# Patient Record
Sex: MALE | Race: WHITE | Employment: UNEMPLOYED | URBAN - METROPOLITAN AREA
[De-identification: names, ages, dates, MRNs, and addresses within clinical notes are randomized per-mention and may not be internally consistent; named-entity substitution may affect disease eponyms.]

---

## 2019-01-04 ENCOUNTER — HOSPITAL ENCOUNTER (OUTPATIENT)
Dept: ORTHOPEDIC SURGERY | Age: 79
Discharge: HOME | End: 2019-01-06
Payer: MEDICARE

## 2019-01-04 DIAGNOSIS — M25.561 ARTHRALGIA OF RIGHT LOWER LEG: ICD-10-CM

## 2019-01-04 DIAGNOSIS — M25.562 ARTHRALGIA OF LEFT LOWER LEG: ICD-10-CM

## 2019-01-04 PROCEDURE — 73564 X-RAY EXAM KNEE 4 OR MORE: CPT

## 2022-04-08 ENCOUNTER — HOSPITAL ENCOUNTER (OUTPATIENT)
Dept: PHYSICAL THERAPY | Age: 82
Setting detail: THERAPIES SERIES
Discharge: HOME | End: 2022-04-08
Payer: MEDICARE

## 2022-04-08 PROCEDURE — 97162 PT EVAL MOD COMPLEX 30 MIN: CPT

## 2022-04-08 ASSESSMENT — PAIN DESCRIPTION - LOCATION: LOCATION: HIP

## 2022-04-08 ASSESSMENT — PAIN SCALES - GENERAL: PAINLEVEL_OUTOF10: 4

## 2022-04-08 ASSESSMENT — PAIN DESCRIPTION - DESCRIPTORS: DESCRIPTORS: BURNING

## 2022-04-08 ASSESSMENT — PAIN DESCRIPTION - ORIENTATION: ORIENTATION: LEFT

## 2022-04-08 ASSESSMENT — PAIN DESCRIPTION - PAIN TYPE: TYPE: CHRONIC PAIN

## 2022-04-08 NOTE — PROGRESS NOTES
[address]  PHYSICAL THERAPY EVALUATION    Date: 2022  Patient Name: Celina Alejandre       MRN: [id number]   Account: [de-identified]   : 1940  (80 y.o.)   Gender: male   Referring Practitioner: Montrell Casanova MD                 Diagnosis: Lumbar radiculopathy; Sciatica  Treatment Diagnosis: left hip pain, decreased LE strength, decreased ROM in lumbar and hip  Additional Pertinent Hx: caridac stent, bronchitis, hx of facture of shoulder, OA, vertigo             Past Medical History:  has no past medical history on file. Past Surgical History:   has no past surgical history on file. Vital Signs  Patient Currently in Pain: Yes   Pain Screening  Patient Currently in Pain: Yes  Pain Assessment  Pain Assessment: 0-10  Pain Level: 4  Pain Type: Chronic pain  Pain Location: Hip  Pain Orientation: Left  Pain Descriptors: Burning                Lives With: Spouse  Type of Home: House  Home Layout: One level  ADL Assistance: Independent  Homemaking Assistance: Independent  Ambulation Assistance: Independent (without AD)  Transfer Assistance: Independent  Leisure & Hobbies: walking (would like to get back to walking)  Additional Comments: denies any falls        Subjective:  Subjective: Patient reports pain on left posterior hip which started in mid January. X-ray of left hip found OA. Increased pain with standing and sitting. Decreased pain with walking. Denies any numbness or tingling. Objective:   Sensation  Overall Sensation Status: Impaired  Additional Comments: reports some tingling in left posterior hip but denies any numbness              Ambulation 1  Surface: carpet  Device: No Device  Assistance: Independent  Gait Deviations: Slow Silva  Distance: clinical distance in department        Transfers  Sit to Stand: Independent  Stand to sit:  Independent    Strength RLE  R Hip Flexion: 4+/5  R Hip Extension: 4/5  R Hip ABduction: 4/5  R Hip Internal Rotation: 4/5  R Hip External Rotation: 4/5  R Knee Flexion: 5/5  R Knee Extension: 5/5  R Ankle Dorsiflexion: 5/5  Strength LLE  L Hip Flexion: 4+/5  L Hip Extension: 4/5  L Hip ABduction: 4/5  L Hip Internal Rotation: 4-/5  L Hip External Rotation: 4/5  L Knee Flexion: 5/5  L Knee Extension: 5/5  L Ankle Dorsiflexion: 5/5        Strength Other  Other: Decreased core strength based off functional mobility    PROM RLE (degrees)  RLE General PROM: SLR: 70 deg     PROM LLE (degrees)  LLE General PROM: SLR: 55 deg                   Spine  Lumbar: flexion 75% WFLs, bilateral SB 50% WFLs, ext WFLs, rotation limited    Observation/Palpation  Posture: Fair (rounded shoulders, posterior pelvic tilt)  Palpation: increased tightness in lumbar paraspinals  Observation: leg length WFLs  Bed mobility  Supine to Sit: Independent  Sit to Supine: Independent  Comment: increased time due to hx of vertigo          Additional Measures  Other: Modified Oswestry: 14/50         Exercises:   Exercises  Exercise 1: prone x 2 minutes  Exercise 2: prone on elbows 30s x 3  Exercise 3: prone quad stretch with strap*  Exercise 4: prone knee flexion*  Exercise 5: prone heel squeezes*  Exercise 6: prone glut sets*  Exercise 7: progress to flexion when appropriate*  Exercise 20: HEP: prone, prone on elbows  Modalities:  Modalities  Moist heat: PRN*  Manual:  Manual therapy  Manual traction: lumbar (leg pulls) 30s x 3 (reports relief of left posterior hip burning)  Soft Tissue Mobalization: STM lumbar, left posterior hip*  *Indicates exercise,modality, or manual techniques to be initiated when appropriate  Assessment: Body structures, Functions, Activity limitations: Decreased ROM,Decreased strength,Increased pain,Decreased posture  Assessment: Patient reports having left posteior hip pain for the last couple of months without relief. Upon PT evaluation, patient demonstrates decreased flexibility with impaired hip strength.   Additionally, patient reports relief with manual lumbar traction. Further PT recommended to decrease symptoms and improve strength/ROM for overall quality of life. Prognosis: Good  Discharge Recommendations: Continue to assess pending progress        Decision Making: Medium Complexity  History: caridac stent, bronchitis, hx of facture of shoulder, OA, vertigo  Exam: left hip pain, decreased LE strength, decreased ROM in lumbar and hip  Clinical Presentation: evolving        Plan  Frequency/Duration:  Plan  Times per week: 2  Plan weeks: 4  Current Treatment Recommendations: Zoila Began Re-education,Manual Therapy - Soft Tissue Mobilization,Manual Therapy - Joint Manipulation,Home Exercise Program,Patient/Caregiver Education & Training,Modalities         Patient Education  New Education Provided: PT Education: Goals;PT Role;Plan of Care;Home Exercise Program    POST-PAIN     Pain Rating (0-10 pain scale):   4/10  Location and pain description same as pre-treatment unless indicated. Action: [] NA  [] Call Physician  [x] Perform HEP  [x] Meds as prescribed    Evaluation and patient rights have been reviewed and patient agrees with plan of care. Yes  [x]  No  []   Explain:       Perkins Fall Risk Assessment  Risk Factor Scale  Score   History of Falls [] Yes  [x] No 25  0 0   Secondary Diagnosis [] Yes  [x] No 15  0 0   Ambulatory Aid [] Furniture  [] Crutches/cane/walker  [x] None/bedrest/wheelchair/nurse 30  15  0 0   IV/Heparin Lock [] Yes  [x] No 20  0 0   Gait/Transferring [] Impaired  [] Weak  [x] Normal/bedrest/immobile 20  10  0 0   Mental Status [] Forgets limitations  [x] Oriented to own ability 15  0 0      Total:0     Based on the Assessment score: check the appropriate box.   [x]  No intervention needed   Low =   Score of 0-24  []  Use standard prevention interventions Moderate =  Score of 24-44   [] Discuss fall prevention strategies   [] Indicate moderate falls risk on eval  []  Use high risk prevention interventions High = Score of 45 and higher   [] Discuss fall prevention strategies   [] Provide supervision during treatment time    Goals  Long term goals  Time Frame for Long term goals : 4 weeks  Long term goal 1: Patient will report >/= 1/10 pain in left posterior hip with all activities. Long term goal 2: Patient will increase left hip SLR >/= 70 degrees for improved functional tolerance. Long term goal 3: Patient will increase strength in bilateral hips = 5/5 for improved walking tolerance. Long term goal 4: Modified Oswestry </= 8/50 to demonstrate functional improvements. Long term goal 5: Patient will be independent with HEP.          PT Individual Minutes  Time In: 1300  Time Out: 3907  Minutes: 40  Timed Code Treatment Minutes: 5 Minutes  Procedure Minutes: 35 PT evaluation minutes     Timed Activity Minutes Units   Ther Ex 5 0   Manual          Electronically signed by Zak Merida PT on 4/8/22 at 2:16 PM EDT

## 2022-04-08 NOTE — PLAN OF CARE
[address]     Ph: [phone]  Fax: [phone]    [x] Certification  [] Recertification [x]  Plan of Care  [] Progress Note [] Discharge      To: Ariana Schmid MD      From:  Chin Rios PT  Patient: Lisa Allen     : 1940  Diagnosis: Lumbar radiculopathy; Sciatica     Date: 2022  Treatment Diagnosis: left hip pain, decreased LE strength, decreased ROM in lumbar and hip    Plan of Care/Certification Expiration Date: 22  Progress Report Period from:  2022  to 2022    Total # of Visits to Date: 1   No Show: 0    Canceled Appointment: 0     OBJECTIVE:   Short Term Goals =Long term goals    Long Term Goals - Time Frame for Long term goals : 4 weeks  Goals Current/ Discharge status Met   Long term goal 1: Patient will report >/= 1/10 pain in left posterior hip with all activities. Patient reports 4/10 pain in left posterior hip. [] yes  [x] no   Long term goal 2: Patient will increase left hip SLR >/= 70 degrees for improved functional tolerance. PROM RLE (degrees)  RLE General PROM: SLR: 70 deg     PROM LLE (degrees)  LLE General PROM: SLR: 55 deg              Spine  Lumbar: flexion 75% WFLs, bilateral SB 50% WFLs, ext WFLs, rotation limited        [] yes  [x] no   Long term goal 3: Patient will increase strength in bilateral hips = 5/5 for improved walking tolerance.  Strength RLE  R Hip Flexion: 4+/5  R Hip Extension: 4/5  R Hip ABduction: 4/5  R Hip Internal Rotation: 4/5  R Hip External Rotation: 4/5  R Knee Flexion: 5/5  R Knee Extension: 5/5  R Ankle Dorsiflexion: 5/5  Strength LLE  L Hip Flexion: 4+/5  L Hip Extension: 4/5  L Hip ABduction: 4/5  L Hip Internal Rotation: 4-/5  L Hip External Rotation: 4/5  L Knee Flexion: 5/5  L Knee Extension: 5/5  L Ankle Dorsiflexion: 5/5        Strength Other  Other: Decreased core strength based off functional mobility   [] yes  [x] no   Long term goal 4: Modified Oswestry </= 8/50 to demonstrate functional improvements. 14/50 [] yes  [x] no   Long term goal 5: Patient will be independent with HEP. Patient issued HEP. [] yes  [x] no        Body structures, Functions, Activity limitations: Decreased ROM,Decreased strength,Increased pain,Decreased posture  Assessment: Patient reports having left posteior hip pain for the last couple of months without relief. Upon PT evaluation, patient demonstrates decreased flexibility with impaired hip strength. Additionally, patient reports relief with manual lumbar traction. Further PT recommended to decrease symptoms and improve strength/ROM for overall quality of life. Prognosis: Good  Discharge Recommendations: Continue to assess pending progress      PT Education: Goals;PT Role;Plan of Care;Home Exercise Program    PLAN: [x] Evaluate and Treat  Frequency/Duration:  Plan  Times per week: 2  Plan weeks: 4  Current Treatment Recommendations: Marilee Hernandez Re-education,Manual Therapy - Soft Tissue Mobilization,Manual Therapy - Joint Manipulation,Home Exercise Program,Patient/Caregiver Education & Training,Modalities     Precautions:                            Patient Status:[x] Continue/ Initiate plan of Care    [] Discharge PT. Recommend pt continue with HEP. [] Additional visits requested, Please re-certify for additional visits:          Signature: Electronically signed by Jessica Odell PT on 4/8/22 at 2:18 PM EDT      If you have any questions or concerns, please don't hesitate to call. Thank you for your referral.    I have reviewed this plan of care and certify a need for medically necessary rehabilitation services.     Physician Signature:__________________________________________________________  Date:  Please sign and return

## 2022-04-15 ENCOUNTER — HOSPITAL ENCOUNTER (OUTPATIENT)
Dept: PHYSICAL THERAPY | Age: 82
Setting detail: THERAPIES SERIES
Discharge: HOME | End: 2022-04-15
Payer: MEDICARE

## 2022-04-15 PROCEDURE — 97140 MANUAL THERAPY 1/> REGIONS: CPT

## 2022-04-15 PROCEDURE — 97110 THERAPEUTIC EXERCISES: CPT

## 2022-04-15 ASSESSMENT — PAIN DESCRIPTION - ORIENTATION: ORIENTATION: LEFT

## 2022-04-15 ASSESSMENT — PAIN SCALES - GENERAL: PAINLEVEL_OUTOF10: 2

## 2022-04-15 ASSESSMENT — PAIN DESCRIPTION - DESCRIPTORS: DESCRIPTORS: BURNING;TIGHTNESS

## 2022-04-15 ASSESSMENT — PAIN DESCRIPTION - LOCATION: LOCATION: HIP

## 2022-04-15 ASSESSMENT — PAIN DESCRIPTION - PAIN TYPE: TYPE: CHRONIC PAIN

## 2022-04-15 NOTE — PROGRESS NOTES
[facility]  Outpatient Physical Therapy    Treatment Note        Date: 4/15/2022  Patient: Jamee Brush  : 1940  ACCT #: [de-identified]  Referring Practitioner: Daisha Reyes MD  Diagnosis: Lumbar radiculopathy; Sciatica  Treatment Diagnosis: left hip pain, decreased LE strength, decreased ROM in lumbar and hip    Visit Information:  PT Visit Information  Onset Date:  ()  PT Insurance Information: Medicare, [employer]  Total # of Visits Approved:  (BMN)  Total # of Visits to Date: 2  Plan of Care/Certification Expiration Date: 22  No Show: 0  Canceled Appointment: 0  Progress Note Counter:  (PN due 22)    Subjective: Pt arriving to appt 12 min late today. Pt cont's to c/o burning in Lt buttock w/ Lt sided LBP.      HEP Compliance:  [x] Good [] Fair [] Poor [] Reports not doing due to:    Vital Signs  Patient Currently in Pain: Yes   Pain Screening  Patient Currently in Pain: Yes  Pain Assessment  Pain Assessment: 0-10  Pain Level: 2 (2-3)  Pain Type: Chronic pain  Pain Location: Hip  Pain Orientation: Left  Pain Descriptors: Burning;Tightness    OBJECTIVE:   Exercises  Exercise 1: prone x 2 minutes  Exercise 2: prone on elbows 30s x 3  Exercise 3: prone quad stretch with strap 3x30\" Lt  Exercise 4: prone knee flexion 2x10  Exercise 5: prone heel squeezes x10  Exercise 6: prone glut sets 5\"x10  Exercise 7: progress to flexion when appropriate*  Exercise 8: Prone press ups (modified from forearms) x10  Exercise 20: HEP: prone quad stretch, prone glute sets, PKF     Strength: [x] NT  [] MMT completed:     ROM: [x] NT  [] ROM measurements:      Manual:   Manual therapy  PROM: Lt hip- all planes 3 min , HS stretching*  Manual traction: lumbar (leg pulls) 30s x 3 (reports relief of left posterior hip burning)  Soft Tissue Mobalization: STM lumbar, left posterior hip 4 min    Modalities:  Modalities  Moist heat: MH to LB 10 min , seated     *Indicates exercise, modality, or manual techniques to be initiated when appropriate    Assessment: Body structures, Functions, Activity limitations: Decreased ROM,Decreased strength,Increased pain,Decreased posture  Assessment: Initiated PT program per POC w/ focus on extension based program for pain/sx management. Gradual decrease in \"burning\" sensation in Lt buttock noted throughout exs. Pt experiencing further relief from manual intervention including STM, lumbar traction and PROM of Lt hip noting full centralization of sx's. Concluded tx w/ MH. Will cont to progress program and introduced to flexion based/core strengthening when appropriate. Treatment Diagnosis: left hip pain, decreased LE strength, decreased ROM in lumbar and hip  Prognosis: Good     Goals:   Long term goals  Time Frame for Long term goals : 4 weeks  Long term goal 1: Patient will report >/= 1/10 pain in left posterior hip with all activities. Long term goal 2: Patient will increase left hip SLR >/= 70 degrees for improved functional tolerance. Long term goal 3: Patient will increase strength in bilateral hips = 5/5 for improved walking tolerance. Long term goal 4: Modified Oswestry </= 8/50 to demonstrate functional improvements. Long term goal 5: Patient will be independent with HEP. Progress toward goals: strength, dec pain/sx's, HS flexibility     POST-PAIN       Pain Rating (0-10 pain scale):  0 /10   Location and pain description same as pre-treatment unless indicated. Action: [x] NA   [] Perform HEP  [] Meds as prescribed  [] Modalities as prescribed   [] Call Physician     Frequency/Duration:  Plan  Times per week: 2  Plan weeks: 4  Current Treatment Recommendations: Modesto Watkins Re-education,Manual Therapy - Soft Tissue Mobilization,Manual Therapy - Joint Manipulation,Home Exercise Program,Patient/Caregiver Education & Training,Modalities     Pt to continue current HEP.   See objective section for any therapeutic exercise changes, additions or modifications this date.      PT Individual Minutes  Time In: 6106  Time Out: 1600  Minutes: 48  Timed Code Treatment Minutes: 38 Minutes  Procedure Minutes: 10 min ()      Timed Activity Minutes Units   Ther Ex 29 2   Manual  9 1     Signature:  Electronically signed by Monica Sharma PTA on 4/15/22 at 3:54 PM EDT

## 2022-04-18 ENCOUNTER — APPOINTMENT (OUTPATIENT)
Dept: PHYSICAL THERAPY | Age: 82
End: 2022-04-18
Payer: MEDICARE

## 2022-04-21 ENCOUNTER — HOSPITAL ENCOUNTER (OUTPATIENT)
Dept: PHYSICAL THERAPY | Age: 82
Setting detail: THERAPIES SERIES
Discharge: HOME | End: 2022-04-21
Payer: MEDICARE

## 2022-04-21 PROCEDURE — 97110 THERAPEUTIC EXERCISES: CPT

## 2022-04-21 ASSESSMENT — PAIN DESCRIPTION - ORIENTATION: ORIENTATION: LEFT

## 2022-04-21 ASSESSMENT — PAIN DESCRIPTION - DESCRIPTORS: DESCRIPTORS: BURNING

## 2022-04-21 ASSESSMENT — PAIN DESCRIPTION - LOCATION: LOCATION: HIP

## 2022-04-21 ASSESSMENT — PAIN SCALES - GENERAL: PAINLEVEL_OUTOF10: 3

## 2022-04-21 NOTE — PROGRESS NOTES
[facility]  Outpatient Physical Therapy    Treatment Note        Date: 2022  Patient: Kenneth Mott  : 1940   Confirmed: Yes  MRN: [id number]  Referring Provider: Riddhi Beck MD   Secondary Referring Provider:        Treatment Diagnosis: left hip pain, decreased LE strength, decreased ROM in lumbar and hip    Visit Information:  PT Visit Information  Onset Date:  ()  Total # of Visits Approved:  (BMN)  Total # of Visits to Date: 3  No Show: 0  Canceled Appointment: 0  Progress Note Counter: 3/8 (PN due 22)    Subjective Information:  Subjective: pain in left hhip is 3/10  HEP Compliance:  [x] Good [] Fair [] Poor [] Reports not doing due to:    Pain Screening  Patient Currently in Pain: Yes  Pain Level: 3  Pain Location: Hip  Pain Orientation: Left  Pain Descriptors: Burning    Treatment:  Exercises:  Exercises  Exercise 1: prone x 3 minutes  Exercise 2: prone on elbows 30s x 3  Exercise 3: Mod Abelino stretch 20 sec x 3, w/ strap  Exercise 4: prone knee flexion 3 x 10  Exercise 8: Prone press ups 3 x10  Exercise 9: fig-4 stretch 20 sec x 5, seated and supine  Exercise 10: knee to opp shoulder 20 sec x 5  Exercise 11: sciatic nerve glide, seated, ext knee and neck at same time x 20  Exercise 12: HS stretch 30 sec x 5 seated  Exercise 13: hip series x 15, b/  Exercise 14: bridges 3 sec x 15    *Indicates exercise, modality, or manual techniques to be initiated when appropriate  Strength: [] NT  [x] MMT completed:     Strength LLE  Comment: SLR 4/5     ROM: [x] NT  [] ROM measurements:         Assessment:    Body Structures, Functions, Activity Limitations Requiring Skilled Therapeutic Intervention: Decreased ROM,Decreased strength,Increased pain,Decreased posture  Assessment: continued w/ current ex, added, hip series, fig-4 stretch, knee to opp shoulder stretch, HS stretch, sciatic nerve glides and bridges, patient w/ good tolerance to all, no c/o pain increase  Treatment Diagnosis: left hip pain, decreased LE strength, decreased ROM in lumbar and hip  Therapy Prognosis: Good          Post-Pain Assessment:       Pain Rating (0-10 pain scale):  2 /10   Location and pain description same as pre-treatment unless indicated. Action: [] NA   [x] Perform HEP  [] Meds as prescribed  [] Modalities as prescribed   [] Call Physician     GOALS   Patient Goal(s):    Short Term Goals Completed by   Goal Status                                     Long Term Goals Completed by   Goal Status     Not Met     In progress     In progress     In progress     Met                 Plan:  Frequency/Duration:  Plan  Plan Frequency: 2  Plan weeks: 4  Current Treatment Recommendations: Strengthening,ROM,Endurance training,Neuromuscular re-education,Manual Therapy - Joint Manipulation,Manual Therapy - Soft Tissue Mobilization,Home exercise program,Patient/Caregiver education & training,Modalities  Pt to continue current HEP. See objective section for any therapeutic exercise changes, additions or modifications this date.     Therapy Time:      PT Individual Minutes  Time In: 0840  Time Out: 8559  Minutes: 38  Timed Code Treatment Minutes: 38 Minutes  Procedure Minutes:0  Timed Activity Minutes Units   Ther Ex 38 3     Electronically signed by:   Alisia Tobin PTA  Date: 4/21/2022

## 2022-04-26 ENCOUNTER — HOSPITAL ENCOUNTER (OUTPATIENT)
Dept: PHYSICAL THERAPY | Age: 82
Setting detail: THERAPIES SERIES
Discharge: HOME | End: 2022-04-26
Payer: MEDICARE

## 2022-04-26 PROCEDURE — 97110 THERAPEUTIC EXERCISES: CPT

## 2022-04-26 ASSESSMENT — PAIN DESCRIPTION - ORIENTATION: ORIENTATION: LEFT

## 2022-04-26 ASSESSMENT — PAIN SCALES - GENERAL: PAINLEVEL_OUTOF10: 2

## 2022-04-26 ASSESSMENT — PAIN DESCRIPTION - DESCRIPTORS: DESCRIPTORS: BURNING

## 2022-04-26 ASSESSMENT — PAIN DESCRIPTION - LOCATION: LOCATION: HIP

## 2022-04-26 ASSESSMENT — PAIN DESCRIPTION - PAIN TYPE: TYPE: CHRONIC PAIN

## 2022-04-26 NOTE — PROGRESS NOTES
[facility]  Outpatient Physical Therapy    Treatment Note        Date: 2022  Patient: Lisa Allen  : 1940   Confirmed: Yes  MRN: [id number]  Referring Provider: Dyann Hammans, MD   Secondary Referring Provider (If applicable):     Medical Diagnosis: Radiculopathy, lumbar region [M54.16]  Sciatica, unspecified side [M54.30]    Treatment Diagnosis: left hip pain, decreased LE strength, decreased ROM in lumbar and hip    Visit Information:  Insurance: Payor: Kiera Mims / Plan: MEDICARE PART A AND B / Product Type: *No Product type* /   PT Visit Information  Total # of Visits to Date: 4  Plan of Care/Certification Expiration Date: 22  No Show: 0  Progress Note Due Date: 22  Canceled Appointment: 0  Progress Note Counter:  (PN due 22)    Subjective Information:  Subjective: Patient c/o difficulty/ muscle cramping when completing PKF for HEP. \"I wasn't able to bend forward after. \"  HEP Compliance:  [x] Good [] Fair [] Poor [] Reports not doing due to:    Pain Screening  Patient Currently in Pain: Yes  Pain Level: 2  Pain Type: Chronic pain  Pain Location: Hip  Pain Orientation: Left  Pain Descriptors: Burning    Treatment:  Exercises:  Exercises  Exercise 1: prone x 3 minutes  Exercise 2: prone on elbows 30s x 3  Exercise 3: Mod Abelino stretch 30 sec x 3, w/ strap  Exercise 4: prone knee flexion 3 x 10  Exercise 8: Prone press ups 3 x10  Exercise 9: fig-4 [id number]\"x3 supine  Exercise 10: knee to opp shoulder 30 sec x 3  Exercise 20: HEP: prone quad stretch, prone glute sets, PKF    Manual:   Manual Therapy  Joint Mobilization: manual hamstring stretch 3x30\"  Manual Traction: lumbar (leg pulls) 30s x 3 (reports relief of left posterior hip burning)  *Indicates exercise, modality, or manual techniques to be initiated when appropriate  Assessment:    Body Structures, Functions, Activity Limitations Requiring Skilled Therapeutic Intervention: Decreased ROM,Decreased strength,Increased pain,Decreased posture  Assessment: Continued current POC for lumbar mobility and decreased radicular pain. Patient denies change in symptoms with prone progression exercises this date. continues to reports relief with manual lumbar traction. Observed patient carrying wallet in left back pocket. Discussed modifying position to front pocket to improve seated body mechanics. Treatment Diagnosis: left hip pain, decreased LE strength, decreased ROM in lumbar and hip  Therapy Prognosis: Good    Post-Pain Assessment:       Pain Rating (0-10 pain scale):  1-2 /10   Location and pain description same as pre-treatment unless indicated. Action: [x] NA   [] Perform HEP  [] Meds as prescribed  [] Modalities as prescribed   [] Call Physician     GOALS   Patient Goal(s): Patient goals : \"decrease pain\"    Long Term Goals Completed by 4 weeks Goal Status   Patient will report >/= 1/10 pain in left posterior hip with all activities. In progress   Patient will increase left hip SLR >/= 70 degrees for improved functional tolerance. In progress   Patient will increase strength in bilateral hips = 5/5 for improved walking tolerance. In progress   Modified Oswestry </= 8/50 to demonstrate functional improvements. In progress   Patient will be independent with HEP. In progress     Plan:  Frequency/Duration:  Plan  Plan Frequency: 2  Plan weeks: 4  Current Treatment Recommendations: Strengthening,ROM,Endurance training,Neuromuscular re-education,Manual Therapy - Joint Manipulation,Manual Therapy - Soft Tissue Mobilization,Home exercise program,Patient/Caregiver education & training,Modalities  Pt to continue current HEP. See objective section for any therapeutic exercise changes, additions or modifications this date.     Therapy Time:      PT Individual Minutes  Time In: 1000  Time Out: 8663  Minutes: 40  Timed Code Treatment Minutes: 40 Minutes  Procedure Minutes:0  Timed Activity Minutes Units   Ther Ex 34 2 Manual  6 1     Electronically signed by:   Herberth Lion PTA  Date: 4/26/2022

## 2022-04-29 ENCOUNTER — HOSPITAL ENCOUNTER (OUTPATIENT)
Dept: PHYSICAL THERAPY | Age: 82
Setting detail: THERAPIES SERIES
Discharge: HOME | End: 2022-04-29
Payer: MEDICARE

## 2022-04-29 PROCEDURE — 97140 MANUAL THERAPY 1/> REGIONS: CPT

## 2022-04-29 PROCEDURE — 97110 THERAPEUTIC EXERCISES: CPT

## 2022-04-29 ASSESSMENT — PAIN SCALES - GENERAL: PAINLEVEL_OUTOF10: 2

## 2022-04-29 ASSESSMENT — PAIN DESCRIPTION - LOCATION: LOCATION: HIP

## 2022-04-29 ASSESSMENT — PAIN DESCRIPTION - PAIN TYPE: TYPE: CHRONIC PAIN

## 2022-04-29 ASSESSMENT — PAIN DESCRIPTION - DESCRIPTORS: DESCRIPTORS: BURNING

## 2022-04-29 NOTE — PROGRESS NOTES
[facility]  Outpatient Physical Therapy    Treatment Note        Date: 2022  Patient: Estelle Magana  : 1940   Confirmed: Yes  MRN: [id number]  Referring Provider: Zenia Morley MD   Secondary Referring Provider (If applicable):     Medical Diagnosis: Radiculopathy, lumbar region [M54.16]  Sciatica, unspecified side [M54.30] Lumbar radiculopathy; Sciatica  Treatment Diagnosis: left hip pain, decreased LE strength, decreased ROM in lumbar and hip    Visit Information:  Insurance: Payor: Cyn Po / Plan: MEDICARE PART A AND B / Product Type: *No Product type* /   PT Visit Information  PT Insurance Information: Medicare, [employer]  Total # of Visits to Date: 5  Plan of Care/Certification Expiration Date: 22  No Show: 0  Progress Note Due Date: 22  Canceled Appointment: 0  Progress Note Counter:  (PN due 22)    Subjective Information:  Subjective: Patient reports 25% reduction in burning on left hip.   HEP Compliance:  [x] Good [] Fair [] Poor [] Reports not doing due to:    Pain Screening  Patient Currently in Pain: Yes  Pain Assessment: 0-10  Pain Level: 2  Pain Type: Chronic pain  Pain Location: Hip  Pain Descriptors: Burning    Treatment:  Exercises:  Exercises  Exercise 2: prone on elbows 30s x 3  Exercise 3: Mod Abelino stretch 30 sec x 3, w/ strap  Exercise 8: Prone press ups 3 x10  Exercise 9: fig-4 [id number]\"x3 supine  Exercise 10: knee to opp shoulder 30 sec x 3  Exercise 12: seated HS stretch 20s x 3, left  Exercise 13: SLR with TA isometric x 10, s/l hip abduction x 10 (bilateral hip strengthening)  Exercise 14: bridges 5sec x 15  Exercise 20: HEP: bridge, 2 way SLR, hamstring stretch    Manual:   Manual Therapy  Joint Mobilization: manual hamstring stretch 4x30\"  Manual Traction: lumbar (leg pulls) 30s x 4 (reports relief of left posterior hip burning)  Other: total manual: 7 minutes        *Indicates exercise, modality, or manual techniques to be initiated when appropriate    Objective Measures:              Strength: [x] NT  [] MMT completed:                   ROM: [] NT  [x] ROM measurements:                PROM LLE (degrees)  LLE General PROM: SLR: 57 deg                            Assessment: Body Structures, Functions, Activity Limitations Requiring Skilled Therapeutic Intervention: Decreased ROM,Decreased strength,Increased pain,Decreased posture  Assessment: Patient reports decreased left hip burning post treatment. Progressed strengthening as tolerates and added to HEP. Patient continues to demonstrates left hamstring tightness this date so added home hamstring stretch. Continue per POC. Treatment Diagnosis: left hip pain, decreased LE strength, decreased ROM in lumbar and hip  Therapy Prognosis: Good          Post-Pain Assessment:       Pain Rating (0-10 pain scale):   0/10   Location and pain description same as pre-treatment unless indicated. Action: [] NA   [] Perform HEP  [] Meds as prescribed  [] Modalities as prescribed   [] Call Physician     GOALS   Patient Goal(s): Patient goals : \"decrease pain\"        Long Term Goals Completed by 4 weeks Goal Status   LTG 1 Patient will report >/= 1/10 pain in left posterior hip with all activities. In progress   LTG 2 Patient will increase left hip SLR >/= 70 degrees for improved functional tolerance. In progress   LTG 3 Patient will increase strength in bilateral hips = 5/5 for improved walking tolerance. In progress   LTG 4 Modified Oswestry </= 8/50 to demonstrate functional improvements. In progress   LTG 5 Patient will be independent with HEP.  In progress     Plan:  Frequency/Duration:  Plan  Plan Frequency: 2  Plan weeks: 4  Current Treatment Recommendations: Strengthening,ROM,Endurance training,Neuromuscular re-education,Manual Therapy - Joint Manipulation,Manual Therapy - Soft Tissue Mobilization,Home exercise program,Patient/Caregiver education & training,Modalities  Pt to continue current HEP. See objective section for any therapeutic exercise changes, additions or modifications this date.     Therapy Time:      PT Individual Minutes  Time In: 1495  Time Out: 2972  Minutes: 38  Timed Code Treatment Minutes: 38 Minutes  Procedure Minutes:0 minutes  Timed Activity Minutes Units   Ther Ex 31 2   Manual  7 1     Electronically signed by:   Ina Sanchez, PT  Date: 4/29/2022

## 2022-05-03 ENCOUNTER — HOSPITAL ENCOUNTER (OUTPATIENT)
Dept: PHYSICAL THERAPY | Age: 82
Setting detail: THERAPIES SERIES
Discharge: HOME | End: 2022-05-03
Payer: MEDICARE

## 2022-05-03 NOTE — PROGRESS NOTES
Therapy                            Cancellation/No-show Note    Date: 2022  Patient: Yolanda Funes (57 y.o. male)  : 1940  MRN:  [id number]  Referring Physician: Danita Hudson MD     Medical Diagnosis: Radiculopathy, lumbar region [M54.16]  Sciatica, unspecified side [M54.30]      Visit Information:  Insurance: Payor: MEDICARE / Plan: MEDICARE PART A AND B / Product Type: *No Product type* /   Visits to Date: 5   No Show/Cancelled Appts: 0 / 0      For today's appointment patient:  []  Cancelled  []  Rescheduled appointment  [x]  No-show   [x]  Called pt to remind of next appointment     Reason given by patient:  []  Patient ill  []  Conflicting appointment  []  No transportation    []  Conflict with work  []  No reason given  []  Other:      [x] Pt has future appointments scheduled, no follow up needed  [] Pt requests to be on hold.     Reason:   If > 2 weeks please discuss with therapist.  [] Therapist to call pt for follow up     Comments:       Signature: Electronically signed by Roxanne Kendrick PTA on 5/3/22 at 9:44 AM EDT

## 2022-05-05 ENCOUNTER — HOSPITAL ENCOUNTER (OUTPATIENT)
Dept: PHYSICAL THERAPY | Age: 82
Setting detail: THERAPIES SERIES
Discharge: HOME | End: 2022-05-05
Payer: MEDICARE

## 2022-05-05 PROCEDURE — 97140 MANUAL THERAPY 1/> REGIONS: CPT

## 2022-05-05 PROCEDURE — 97110 THERAPEUTIC EXERCISES: CPT

## 2022-05-05 ASSESSMENT — PAIN DESCRIPTION - PAIN TYPE: TYPE: CHRONIC PAIN

## 2022-05-05 ASSESSMENT — PAIN DESCRIPTION - LOCATION: LOCATION: BUTTOCKS;HIP

## 2022-05-05 ASSESSMENT — PAIN SCALES - GENERAL: PAINLEVEL_OUTOF10: 2

## 2022-05-05 ASSESSMENT — PAIN DESCRIPTION - ORIENTATION: ORIENTATION: LEFT

## 2022-05-05 NOTE — PROGRESS NOTES
[address]     Ph: [phone]  Fax: [phone]      [] Certification  [] Recertification []  Plan of Care  [x] Progress Note [] Discharge      Referring Provider: Cici Appiah MD      From:  Horacio Ramirez PTA   Patient: Reinaldo Mcdonnell (49 y.o. male) : 1940 Date: 2022   Medical Diagnosis: Radiculopathy, lumbar region [M54.16]  Sciatica, unspecified side [M54.30]    Treatment Diagnosis: left hip pain, decreased LE strength, decreased ROM in lumbar and hip    Plan of Care/Certification Expiration Date: : 22   Progress Report Period from:  2022  to 2022    Visits to Date: 5 No Show: 0 Cancelled Appts: 0    OBJECTIVE:   Long Term Goals - Time Frame for Long term goals : 4 weeks  Goals Current/ Discharge status Status   Long term goal 1: Patient will report >/= 1/10 pain in left posterior hip with all activities. Pain Screening  Patient Currently in Pain: Yes  Pain Assessment: 0-10  Pain Level: 2 (2-3)  Pain Type: Chronic pain  Pain Location: Buttocks,Hip  Pain Orientation: Left   In progress   Long term goal 2: Patient will increase left hip SLR >/= 70 degrees for improved functional tolerance. PROM RLE (degrees)  RLE General PROM: SLR: 73 deg     PROM LLE (degrees)  LLE General PROM: SLR: 62 deg     Spine  Lumbar: flexion 75% WFLs, bilateral SB 50% WFLs, ext WFLs, rotation limited In progress   Long term goal 3: Patient will increase strength in bilateral hips = 5/5 for improved walking tolerance.  Strength RLE  R Hip Flexion: 5/5  R Hip Extension: 4/5  R Hip ABduction: 4/5  R Hip Internal Rotation: 4+/5  R Hip External Rotation: 4+/5  R Knee Flexion: 5/5  R Knee Extension: 5/5  R Ankle Dorsiflexion: 5/5  Strength LLE  L Hip Flexion: 4+/5  L Hip Extension: 4/5,4-/5  L Hip ABduction: 4/5  L Hip Internal Rotation: 4/5  L Hip External Rotation: 4+/5  L Knee Flexion: 5/5  L Knee Extension: 5/5  L Ankle Dorsiflexion: 5/5   Strength Other  Other: Decreased core strength based off functional mobility In progress   Long term goal 4: Modified Oswestry </= 8/50 to demonstrate functional improvements. 14/50 or 72% function  In progress   Long term goal 5: Patient will be independent with HEP. Indep/compliant  In progress     Body Structures, Functions, Activity Limitations Requiring Skilled Therapeutic Intervention: Decreased ROM,Decreased strength,Increased pain,Decreased posture  Assessment: PN completed today per Medicare guidelines noting good improvements in strength and functional mobility. Recent pain has remained centralized to Lt side of LB/buttock region w/ pt recently self reporting 25% decrease in overall pain/sx's since start of PT. Discussed 2 visits remaining in which pt expressed readiness for transition to indep program at end of POC. Will plan to focus sessions on progression of hip and flexion based core strengthening w/ finalization of HEP prior to D/C. Specific Instructions for Next Treatment: Anticipate D/C at end of POC  Therapy Prognosis: Good    PLAN:   Frequency/Duration:  Plan Frequency: 2  Plan weeks: 4  Specific Instructions for Next Treatment: Anticipate D/C at end of POC  Current Treatment Recommendations: Strengthening,ROM,Endurance training,Neuromuscular re-education,Manual Therapy - Joint Manipulation,Manual Therapy - Soft Tissue Mobilization,Home exercise program,Patient/Caregiver education & training,Modalities  Plan Comment: PN completed this date for Medicare            Patient Status:[x] Continue/ Initiate plan of Care    [] Discharge PT. Recommend pt continue with HEP.      [] Additional visits requested, Please re-certify for additional visits:    [] Hold         Signature: Electronically signed by Holley Carey PTA on 5/5/22 at 10:33 AM EDT  Electronically signed by Grabiel Molina PT on 5/5/2022 at 11:23 AM        If you have any questions or concerns, please don't hesitate to call. Thank you for your referral.    I have reviewed this plan of care and certify a need for medically necessary rehabilitation services.     Physician Signature:__________________________________________________________  Date:  Please sign and return

## 2022-05-05 NOTE — PROGRESS NOTES
[facility]  Outpatient Physical Therapy    Treatment Note        Date: 2022  Patient: Jose Ramon Persaud  : 1940   Confirmed: Yes  MRN: [id number]  Referring Provider: Johnny Timmons MD   Secondary Referring Provider (If applicable):     Medical Diagnosis: Radiculopathy, lumbar region [M54.16]  Sciatica, unspecified side [M54.30]    Treatment Diagnosis: left hip pain, decreased LE strength, decreased ROM in lumbar and hip    Visit Information:  Insurance: Payor: Roxy Barnett / Plan: MEDICARE PART A AND B / Product Type: *No Product type* /   PT Visit Information  Total # of Visits to Date: 5  Plan of Care/Certification Expiration Date: 22  No Show: 0  Progress Note Due Date: 22  Canceled Appointment: 0  Progress Note Counter:  (PN completed)    Subjective Information:  Subjective: Pt states \"It's gettin' better. \"  HEP Compliance:  [x] Good [] Fair [] Poor [] Reports not doing due to:    Pain Screening  Patient Currently in Pain: Yes  Pain Assessment: 0-10  Pain Level: 2 (2-3)  Pain Type: Chronic pain  Pain Location: Buttocks,Hip  Pain Orientation: Left    Treatment:  Exercises:  Exercises  Exercise 2: prone on elbows 30s x 3  Exercise 3: Clams (2 way) x10 ea, b/l  Exercise 4: prone knee flexion stretch 3x30\"  Exercise 5: S/L ITB stretch*  Exercise 6: BW squats*  Exercise 7: DLS: abd march U12, alt UE/LE x10  Exercise 8: Prone press ups 3 x10  Exercise 9: fig-4 brdamch22\"x3 supine  Exercise 10: knee to opp shoulder 30 sec x 3  Exercise 12: seated HS stretch 20s x 3, left  Exercise 13: SLR with TA isometric x 10, s/l hip abduction x 10 (bilateral hip strengthening)  Exercise 14: bridges 5sec x 15  Exercise 20: HEP: prone hip ext, clams     Manual:    Objective measures 10 min      Objective Measures:     Strength: [] NT  [x] MMT completed:  Strength RLE  R Hip Flexion: 5/5  R Hip Extension: 4/5  R Hip ABduction: 4/5  R Hip Internal Rotation: 4+/5  R Hip External Rotation: 4+/5  R Knee Flexion: 5/5  R Knee Extension: 5/5  R Ankle Dorsiflexion: 5/5  Strength LLE  L Hip Flexion: 4+/5  L Hip Extension: 4/5,4-/5  L Hip ABduction: 4/5  L Hip Internal Rotation: 4/5  L Hip External Rotation: 4+/5  L Knee Flexion: 5/5  L Knee Extension: 5/5  L Ankle Dorsiflexion: 5/5   Strength Other  Other: Decreased core strength based off functional mobility    ROM: [] NT  [x] ROM measurements:   PROM LLE (degrees)  LLE General PROM: SLR: 62 deg   PROM RLE (degrees)  RLE General PROM: SLR: 73 deg    Spine  Lumbar: flexion 75% WFLs, bilateral SB 50-75% WFLs, ext WFLs, rotation limited    Assessment: Body Structures, Functions, Activity Limitations Requiring Skilled Therapeutic Intervention: Decreased ROM,Decreased strength,Increased pain,Decreased posture  Assessment: PN completed today per Medicare guidelines noting good improvements in strength and functional mobility. Recent pain has remained centralized to Lt side of LB/buttock region w/ pt recently self reporting 25% decrease in overall pain/sx's since start of PT. Discussed 2 visits remaining in which pt expressed readiness for transition to indep program at end of POC. Will plan to focus sessions on progression of hip and flexion based core strengthening w/ finalization of HEP prior to D/C. Treatment Diagnosis: left hip pain, decreased LE strength, decreased ROM in lumbar and hip  Therapy Prognosis: Good     Post-Pain Assessment:       Pain Rating (0-10 pain scale):   1-2/10   Location and pain description same as pre-treatment unless indicated. Action: [] NA   [x] Perform HEP  [] Meds as prescribed  [x] Modalities as prescribed   [] Call Physician     GOALS   Patient Goal(s): Patient goals : \"decrease pain\"    Long Term Goals Completed by 4 weeks Goal Status   LTG 1 Patient will report >/= 1/10 pain in left posterior hip with all activities. In progress   LTG 2 Patient will increase left hip SLR >/= 70 degrees for improved functional tolerance.  In progress   LTG 3 Patient will increase strength in bilateral hips = 5/5 for improved walking tolerance. In progress   LTG 4 Modified Oswestry </= 8/50 to demonstrate functional improvements. In progress   LTG 5 Patient will be independent with HEP. In progress     Plan:  Frequency/Duration:  Plan  Plan Frequency: 2  Plan weeks: 4  Specific Instructions for Next Treatment: Anticipate D/C at end of POC  Current Treatment Recommendations: Strengthening,ROM,Endurance training,Neuromuscular re-education,Manual Therapy - Joint Manipulation,Manual Therapy - Soft Tissue Mobilization,Home exercise program,Patient/Caregiver education & training,Modalities  Plan Comment: PN completed this date for Medicare  Pt to continue current HEP. See objective section for any therapeutic exercise changes, additions or modifications this date.     Therapy Time:      PT Individual Minutes  Time In: 0042  Time Out: 7063  Minutes: 38  Timed Code Treatment Minutes: 38 Minutes  Procedure Minutes: N/A   Timed Activity Minutes Units   Ther Ex 28 2   Manual  10 1     Electronically signed by Thomas Harrison PTA on 5/5/22 at 10:31 AM EDT

## 2022-05-10 ENCOUNTER — HOSPITAL ENCOUNTER (OUTPATIENT)
Dept: PHYSICAL THERAPY | Age: 82
Setting detail: THERAPIES SERIES
Discharge: HOME | End: 2022-05-10
Payer: MEDICARE

## 2022-05-10 PROCEDURE — 97110 THERAPEUTIC EXERCISES: CPT

## 2022-05-10 ASSESSMENT — PAIN DESCRIPTION - ORIENTATION: ORIENTATION: LEFT

## 2022-05-10 ASSESSMENT — PAIN DESCRIPTION - DESCRIPTORS: DESCRIPTORS: DISCOMFORT

## 2022-05-10 ASSESSMENT — PAIN SCALES - GENERAL: PAINLEVEL_OUTOF10: 2

## 2022-05-10 ASSESSMENT — PAIN DESCRIPTION - PAIN TYPE: TYPE: CHRONIC PAIN

## 2022-05-10 ASSESSMENT — PAIN DESCRIPTION - LOCATION: LOCATION: BUTTOCKS

## 2022-05-10 NOTE — PROGRESS NOTES
[facility]  Outpatient Physical Therapy    Treatment Note        Date: 5/10/2022  Patient: Celina Alejandre  : 1940   Confirmed: Yes  MRN: [id number]  Referring Provider: Arlin Santiago MD   Secondary Referring Provider (If applicable):     Medical Diagnosis: Radiculopathy, lumbar region [M54.16]  Sciatica, unspecified side [M54.30]    Treatment Diagnosis: left hip pain, decreased LE strength, decreased ROM in lumbar and hip    Visit Information:  Insurance: Payor: Daylin Hartleyg / Plan: MEDICARE PART A AND B / Product Type: *No Product type* /   PT Visit Information  Total # of Visits to Date: 5  Plan of Care/Certification Expiration Date: 22  No Show: 0  Progress Note Due Date: 22  Canceled Appointment: 0  Progress Note Counter:     Subjective Information:  Subjective: pain is 1-2/10, I no longer have that burning pain  HEP Compliance:  [x] Good [] Fair [] Poor [] Reports not doing due to:    Pain Screening  Patient Currently in Pain: Yes  Pain Level: 2  Pain Type: Chronic pain  Pain Location: Buttocks  Pain Orientation: Left  Pain Descriptors: Discomfort    Treatment:  Exercises:  Exercises  Exercise 4: PKF 3 x 10  Exercise 9: fig-4 stretch 30\"x 3, b/l,  seated  Exercise 10: knee to opp shoulder 30 sec x 3, b/l, seated  Exercise 11: hip series x 10, b/l  Exercise 14: bridges 5sec x 15     *Indicates exercise, modality, or manual techniques to be initiated when appropriate    Objective Measures:      Strength: [x] NT  [] MMT completed:     ROM: [x] NT  [] ROM measurements:         Assessment:    Body Structures, Functions, Activity Limitations Requiring Skilled Therapeutic Intervention: Decreased ROM,Decreased strength,Increased pain,Decreased posture  Assessment: continued w/ couuent stretches and exercises, and added hip series, b/l, good tolerance to shortened session  Treatment Diagnosis: left hip pain, decreased LE strength, decreased ROM in lumbar and hip  Therapy Prognosis: Good      Post-Pain Assessment:       Pain Rating (0-10 pain scale):   0/10   Location and pain description same as pre-treatment unless indicated. Action: [] NA   [x] Perform HEP  [] Meds as prescribed  [] Modalities as prescribed   [] Call Physician     GOALS   Patient Goal(s): Patient goals : \"decrease pain\"    Short Term Goals Completed by   Goal Status   STG 1   In progress       Long Term Goals Completed by 4 weeks Goal Status   LTG 1 Patient will report >/= 1/10 pain in left posterior hip with all activities. In progress   LTG 2 Patient will increase left hip SLR >/= 70 degrees for improved functional tolerance. In progress   LTG 3 Patient will increase strength in bilateral hips = 5/5 for improved walking tolerance. In progress   LTG 4 Modified Oswestry </= 8/50 to demonstrate functional improvements. In progress   LTG 5 Patient will be independent with HEP. In progress            Plan:  Frequency/Duration:  Plan  Plan Comment: D/C on NV  Pt to continue current HEP. See objective section for any therapeutic exercise changes, additions or modifications this date.     Therapy Time:      PT Individual Minutes  Time In: 1247  Time Out: 9584  Minutes: 25  Timed Code Treatment Minutes: 25 Minutes  Procedure Minutes:0  Timed Activity Minutes Units   Ther Ex 24 2     Electronically signed by Patsy Temple PTA on 5/10/22 at 9:55 AM EDT

## 2022-05-19 ENCOUNTER — HOSPITAL ENCOUNTER (OUTPATIENT)
Dept: PHYSICAL THERAPY | Age: 82
Setting detail: THERAPIES SERIES
Discharge: HOME | End: 2022-05-19
Payer: MEDICARE

## 2022-05-19 PROCEDURE — 97140 MANUAL THERAPY 1/> REGIONS: CPT

## 2022-05-19 PROCEDURE — 97110 THERAPEUTIC EXERCISES: CPT

## 2022-05-19 ASSESSMENT — PAIN DESCRIPTION - DESCRIPTORS: DESCRIPTORS: DISCOMFORT

## 2022-05-19 ASSESSMENT — PAIN SCALES - GENERAL: PAINLEVEL_OUTOF10: 1

## 2022-05-19 ASSESSMENT — PAIN DESCRIPTION - PAIN TYPE: TYPE: CHRONIC PAIN

## 2022-05-19 ASSESSMENT — PAIN DESCRIPTION - LOCATION: LOCATION: BUTTOCKS

## 2022-05-19 ASSESSMENT — PAIN DESCRIPTION - ORIENTATION: ORIENTATION: LEFT

## 2022-05-19 NOTE — PROGRESS NOTES
[address]     Ph: [phone]  Fax: [phone]      [] Certification  [] Recertification []  Plan of Care  [] Progress Note [x] Discharge      Referring Provider: Dat Tovar MD      From:  Maximo Martin PT  Patient: Dana Dobbs (65 y.o. male) : 1940 Date: 2022   Medical Diagnosis: Radiculopathy, lumbar region [M54.16]  Sciatica, unspecified side [M54.30] Lumbar radiculopathy; Sciatica  Treatment Diagnosis: left hip pain, decreased LE strength, decreased ROM in lumbar and hip    Plan of Care/Certification Expiration Date: : 22   Progress Report Period from:  2022  to 2022    Visits to Date: 8 No Show: 1 Cancelled Appts: 0    OBJECTIVE:   Long Term Goals - Time Frame for Long term goals : 4 weeks  Goals Current/ Discharge status Status   Long term goal 1: Patient will report >/= 1/10 pain in left posterior hip with all activities. Pain Screening  Patient Currently in Pain: Yes  Pain Assessment: 0-10  Pain Level: 1  Pain Type: Chronic pain  Pain Location: Buttocks  Pain Orientation: Left  Pain Descriptors: Discomfort   Met   Long term goal 2: Patient will increase left hip SLR >/= 70 degrees for improved functional tolerance. PROM RLE (degrees)  RLE General PROM: SLR: 73 deg     PROM LLE (degrees)  LLE General PROM: SLR: 63 deg     Spine  Lumbar: flexion 75% WFLs, bilateral SB 75% WFLs, ext WFLs, rotation limited   Partially met   Long term goal 3: Patient will increase strength in bilateral hips = 5/5 for improved walking tolerance.  Strength RLE  R Hip Flexion: 5/5  R Hip Extension: 4/5  R Hip ABduction: 4+/5  R Hip Internal Rotation: 4+/5  R Hip External Rotation: 4+/5  R Knee Flexion: 5/5  R Knee Extension: 5/5  R Ankle Dorsiflexion: 5/5  Strength LLE  L Hip Flexion: 4+/5  L Hip Extension: 4/5,4+/5  L Hip ABduction: 4/5  L Hip Internal Rotation: 4/5  L Hip External Rotation: 4+/5  L Knee Flexion: 5/5  L Knee Extension: 5/5  L Ankle Dorsiflexion: 5/5     Strength Other  Other: Improved core strength based off functional mobility Partially met   Long term goal 4: Modified Oswestry </= 8/50 to demonstrate functional improvements. 7/50 or 86% function  Met   Long term goal 5: Patient will be independent with HEP. Indep/compliant Met     Body Structures, Functions, Activity Limitations Requiring Skilled Therapeutic Intervention: Decreased ROM,Decreased strength,Increased pain,Decreased posture  Assessment: Pt reaching end of POC w/ discharge from therapy necessary as previously discussed last week. Pt demo's good improvements in B LE strength w/ lumbar mobility inc by ~25% in flex and SB planes of mvmt. Recent pain has been centralized from [address] to buttock/ LBP regions w/ overall decrease in intensity/frequency of pain by 90% per pt report. Pt plans to cont HEP provided at indep level as well as routine ex program through renewal of [employer]. Portion of tx spent reviewing and edu pt on appropriate gym equipment to maintain hip/core strength; good understanding. Therapy Prognosis: Good    PLAN:   Frequency/Duration:  Plan Comment: D/C this date                 Patient Status:[] Continue/ Initiate plan of Care    [x] Discharge PT. Recommend pt continue with HEP. [] Additional visits requested, Please re-certify for additional visits:    [] Hold         Signature: Electronically signed by Sandy Muhammad PTA on 5/19/22 at 12:45 PM EDT  Electronically signed by Juan Luis Milton PT on 5/27/2022 at 10:24 AM      If you have any questions or concerns, please don't hesitate to call. Thank you for your referral.    I have reviewed this plan of care and certify a need for medically necessary rehabilitation services.     Physician Signature:__________________________________________________________  Date:  Please sign and return

## 2022-05-19 NOTE — PROGRESS NOTES
[facility]  Outpatient Physical Therapy    Treatment Note        Date: 2022  Patient: Celina Alejandre  : 1940   Confirmed: Yes  MRN: [id number]  Referring Provider: Arlin Santiago MD   Secondary Referring Provider (If applicable):     Medical Diagnosis: Radiculopathy, lumbar region [M54.16]  Sciatica, unspecified side [M54.30] Lumbar radiculopathy; Sciatica  Treatment Diagnosis: left hip pain, decreased LE strength, decreased ROM in lumbar and hip    Visit Information:  Insurance: Payor: [employer] / Plan: MEDICARE PART A AND B / Product Type: *No Product type* /   PT Visit Information  PT Insurance Information: Medicare, [employer]  Total # of Visits to Date:   Plan of Care/Certification Expiration Date: 22  No Show: 1  Progress Note Due Date: 22  Canceled Appointment: 0  Progress Note Counter:     Subjective Information:  Subjective: Pt states \"The legs, I have'nt had any complaints. But right at the top of my buttock feels like a bruise. \"  HEP Compliance:  [x] Good [] Fair [] Poor [] Reports not doing due to:    Pain Screening  Patient Currently in Pain: Yes  Pain Assessment: 0-10  Pain Level: 1  Pain Type: Chronic pain  Pain Location: Buttocks  Pain Orientation: Left  Pain Descriptors: Discomfort    Treatment:  Exercises:  Exercises  Exercise 1: Objective measures for D/C  Exercise 2: Review of HEP  Exercise 3: Edu on gym quip     Manual:   Objective measures; 10 min     Objective Measures:      Strength: [] NT  [x] MMT completed:  Strength RLE  R Hip Flexion: 5/5  R Hip Extension: 4/5  R Hip ABduction: 4+/5  R Hip Internal Rotation: 4+/5  R Hip External Rotation: 4+/5  R Knee Flexion: 5/5  R Knee Extension: 5/5  R Ankle Dorsiflexion: 5/5  Strength LLE  L Hip Flexion: 4+/5  L Hip Extension: 4/5,4+/5  L Hip ABduction: 4/5  L Hip Internal Rotation: 4/5  L Hip External Rotation: 4+/5  L Knee Flexion: 5/5  L Knee Extension: 5/5  L Ankle Dorsiflexion: 5/5   Strength Other  Other: Decreased core strength based off functional mobility    ROM: [] NT  [x] ROM measurements:   PROM LLE (degrees)  LLE General PROM: SLR: 63 deg   PROM RLE (degrees)  RLE General PROM: SLR: 73 deg    Spine  Lumbar: flexion 75% WFLs, bilateral SB 75% WFLs, ext WFLs, rotation limited  Spine  Lumbar: flexion 75% WFLs, bilateral SB 75% WFLs, ext WFLs, rotation limited     Assessment: Body Structures, Functions, Activity Limitations Requiring Skilled Therapeutic Intervention: Decreased ROM,Decreased strength,Increased pain,Decreased posture  Assessment: Pt reaching end of POC w/ discharge from therapy necessary as previously discussed last week. Pt demo's good improvements in B LE strength w/ lumbar mobility inc by ~25% in flex and SB planes of mvmt. Recent pain has been centralized from [address] to buttock/ LBP regions w/ overall decrease in intensity/frequency of pain by 90% per pt report. Pt plans to cont HEP provided at indep level as well as routine ex program through renewal of [employer]. Portion of tx spent reviewing and edu pt on appropriate gym equipment to maintain hip/core strength; good understanding. Treatment Diagnosis: left hip pain, decreased LE strength, decreased ROM in lumbar and hip  Therapy Prognosis: Good     Post-Pain Assessment:       Pain Rating (0-10 pain scale):   1/10   Location and pain description same as pre-treatment unless indicated. Action: [] NA   [x] Perform HEP  [] Meds as prescribed  [x] Modalities as prescribed   [] Call Physician     GOALS   Patient Goal(s): Patient goals : \"decrease pain\"    Short Term Goals Completed by   Goal Status   STG 1   Met     Long Term Goals Completed by 4 weeks Goal Status   LTG 1 Patient will report >/= 1/10 pain in left posterior hip with all activities. Met   LTG 2 Patient will increase left hip SLR >/= 70 degrees for improved functional tolerance.  Partially met   LTG 3 Patient will increase strength in bilateral hips = 5/5 for improved walking tolerance. In progress   LTG 4 Modified Oswestry </= 8/50 to demonstrate functional improvements. Met   LTG 5 Patient will be independent with HEP. Met     Plan:  Frequency/Duration:  Plan  Plan Comment: D/C this date  Pt to continue current HEP. See objective section for any therapeutic exercise changes, additions or modifications this date.     Therapy Time:      PT Individual Minutes  Time In: 5158  Time Out: 3554  Minutes: 31  Timed Code Treatment Minutes: 31 Minutes  Procedure Minutes: N/A   Timed Activity Minutes Units   Ther Ex 21 1   Manual  10 1     Electronically signed by Sandy Trejo PTA on 5/19/22 at 12:42 PM EDT

## 2023-05-03 LAB
ALANINE AMINOTRANSFERASE (SGPT) (U/L) IN SER/PLAS: 18 U/L (ref 10–52)
ALBUMIN (G/DL) IN SER/PLAS: 4.2 G/DL (ref 3.4–5)
ALKALINE PHOSPHATASE (U/L) IN SER/PLAS: 49 U/L (ref 33–136)
ANION GAP IN SER/PLAS: 11 MMOL/L (ref 10–20)
ASPARTATE AMINOTRANSFERASE (SGOT) (U/L) IN SER/PLAS: 18 U/L (ref 9–39)
BILIRUBIN TOTAL (MG/DL) IN SER/PLAS: 0.9 MG/DL (ref 0–1.2)
CALCIUM (MG/DL) IN SER/PLAS: 9.1 MG/DL (ref 8.6–10.3)
CARBON DIOXIDE, TOTAL (MMOL/L) IN SER/PLAS: 28 MMOL/L (ref 21–32)
CHLORIDE (MMOL/L) IN SER/PLAS: 104 MMOL/L (ref 98–107)
CHOLESTEROL (MG/DL) IN SER/PLAS: 104 MG/DL (ref 0–199)
CHOLESTEROL IN HDL (MG/DL) IN SER/PLAS: 31.8 MG/DL
CHOLESTEROL/HDL RATIO: 3.3
CREATININE (MG/DL) IN SER/PLAS: 1.14 MG/DL (ref 0.5–1.3)
GFR MALE: 65 ML/MIN/1.73M2
GLUCOSE (MG/DL) IN SER/PLAS: 128 MG/DL (ref 74–99)
LDL: 56 MG/DL (ref 0–99)
POTASSIUM (MMOL/L) IN SER/PLAS: 4.4 MMOL/L (ref 3.5–5.3)
PROTEIN TOTAL: 7 G/DL (ref 6.4–8.2)
SODIUM (MMOL/L) IN SER/PLAS: 139 MMOL/L (ref 136–145)
TRIGLYCERIDE (MG/DL) IN SER/PLAS: 80 MG/DL (ref 0–149)
UREA NITROGEN (MG/DL) IN SER/PLAS: 17 MG/DL (ref 6–23)
VLDL: 16 MG/DL (ref 0–40)

## 2024-01-31 PROBLEM — E78.2 MIXED HYPERLIPIDEMIA: Status: ACTIVE | Noted: 2024-01-31

## 2024-01-31 PROBLEM — H04.123 DRY EYES: Status: ACTIVE | Noted: 2024-01-31

## 2024-01-31 PROBLEM — R00.1 SINUS BRADYCARDIA: Status: ACTIVE | Noted: 2024-01-31

## 2024-01-31 PROBLEM — N40.0 ENLARGED PROSTATE: Status: ACTIVE | Noted: 2024-01-31

## 2024-01-31 PROBLEM — I25.10 CAD (CORONARY ARTERY DISEASE): Status: ACTIVE | Noted: 2024-01-31

## 2024-01-31 PROBLEM — I10 ESSENTIAL HYPERTENSION: Status: ACTIVE | Noted: 2024-01-31

## 2024-01-31 PROBLEM — M25.559 HIP JOINT PAIN: Status: ACTIVE | Noted: 2024-01-31

## 2024-01-31 PROBLEM — M54.16 LUMBAR RADICULOPATHY: Status: ACTIVE | Noted: 2024-01-31

## 2024-01-31 PROBLEM — R01.1 CARDIAC MURMUR: Status: ACTIVE | Noted: 2024-01-31

## 2024-01-31 PROBLEM — R07.9 CHEST PAIN: Status: ACTIVE | Noted: 2024-01-31

## 2024-01-31 PROBLEM — R91.8 PULMONARY NODULES/LESIONS, MULTIPLE: Status: ACTIVE | Noted: 2024-01-31

## 2024-01-31 PROBLEM — E66.9 OBESITY: Status: ACTIVE | Noted: 2024-01-31

## 2024-01-31 PROBLEM — R73.9 HYPERGLYCEMIA: Status: ACTIVE | Noted: 2024-01-31

## 2024-01-31 PROBLEM — I35.0 AORTIC VALVE STENOSIS: Status: ACTIVE | Noted: 2024-01-31

## 2024-01-31 PROBLEM — B35.6 TINEA CRURIS: Status: ACTIVE | Noted: 2024-01-31

## 2024-01-31 PROBLEM — K21.9 GERD (GASTROESOPHAGEAL REFLUX DISEASE): Status: ACTIVE | Noted: 2024-01-31

## 2024-01-31 PROBLEM — M54.30 SCIATICA: Status: ACTIVE | Noted: 2024-01-31

## 2024-01-31 RX ORDER — LISINOPRIL 40 MG/1
1 TABLET ORAL DAILY
COMMUNITY
Start: 2018-10-23

## 2024-01-31 RX ORDER — CLOPIDOGREL BISULFATE 75 MG/1
75 TABLET ORAL DAILY
COMMUNITY
Start: 2023-03-20

## 2024-01-31 RX ORDER — METOPROLOL TARTRATE 25 MG/1
2 TABLET, FILM COATED ORAL 2 TIMES DAILY
COMMUNITY
Start: 2018-10-23

## 2024-01-31 RX ORDER — PREDNISOLONE ACETATE 10 MG/ML
1 SUSPENSION/ DROPS OPHTHALMIC DAILY
COMMUNITY
Start: 2022-01-04

## 2024-01-31 RX ORDER — ISOSORBIDE MONONITRATE 30 MG/1
1 TABLET, EXTENDED RELEASE ORAL DAILY
COMMUNITY
Start: 2018-10-23

## 2024-01-31 RX ORDER — AMLODIPINE BESYLATE 2.5 MG/1
1 TABLET ORAL 2 TIMES DAILY
COMMUNITY
Start: 2018-10-23

## 2024-01-31 RX ORDER — CYCLOBENZAPRINE HCL 5 MG
5 TABLET ORAL 2 TIMES DAILY
COMMUNITY
Start: 2021-08-31

## 2024-01-31 RX ORDER — DICLOFENAC SODIUM 75 MG/1
75 TABLET, DELAYED RELEASE ORAL 2 TIMES DAILY
COMMUNITY
Start: 2022-05-03 | End: 2024-05-22 | Stop reason: WASHOUT

## 2024-01-31 RX ORDER — LEVOTHYROXINE SODIUM 50 UG/1
1 TABLET ORAL DAILY
COMMUNITY
End: 2024-05-22 | Stop reason: WASHOUT

## 2024-01-31 RX ORDER — ATORVASTATIN CALCIUM 40 MG/1
1 TABLET, FILM COATED ORAL DAILY
COMMUNITY
Start: 2018-10-23

## 2024-01-31 RX ORDER — NITROGLYCERIN 0.4 MG/1
0.4 TABLET SUBLINGUAL EVERY 5 MIN PRN
COMMUNITY

## 2024-01-31 RX ORDER — OMEPRAZOLE 20 MG/1
20 CAPSULE, DELAYED RELEASE ORAL
COMMUNITY
Start: 2018-10-23

## 2024-01-31 RX ORDER — TERAZOSIN 2 MG/1
2 CAPSULE ORAL NIGHTLY
COMMUNITY
Start: 2018-10-23

## 2024-01-31 RX ORDER — SERTRALINE HYDROCHLORIDE 25 MG/1
1 TABLET, FILM COATED ORAL NIGHTLY
COMMUNITY
Start: 2022-04-27

## 2024-05-06 ENCOUNTER — HOSPITAL ENCOUNTER (OUTPATIENT)
Dept: RADIOLOGY | Facility: CLINIC | Age: 84
End: 2024-05-06
Payer: MEDICARE

## 2024-05-06 ENCOUNTER — HOSPITAL ENCOUNTER (OUTPATIENT)
Dept: CARDIOLOGY | Facility: CLINIC | Age: 84
End: 2024-05-06
Payer: MEDICARE

## 2024-05-06 ENCOUNTER — LAB (OUTPATIENT)
Dept: LAB | Facility: LAB | Age: 84
End: 2024-05-06
Payer: MEDICARE

## 2024-05-06 ENCOUNTER — HOSPITAL ENCOUNTER (OUTPATIENT)
Dept: RADIOLOGY | Facility: CLINIC | Age: 84
Discharge: HOME | End: 2024-05-06
Payer: MEDICARE

## 2024-05-06 DIAGNOSIS — E78.2 MIXED HYPERLIPIDEMIA: ICD-10-CM

## 2024-05-06 DIAGNOSIS — I25.10 ATHEROSCLEROTIC HEART DISEASE OF NATIVE CORONARY ARTERY WITHOUT ANGINA PECTORIS: ICD-10-CM

## 2024-05-06 DIAGNOSIS — I10 ESSENTIAL (PRIMARY) HYPERTENSION: Primary | ICD-10-CM

## 2024-05-06 DIAGNOSIS — R00.1 BRADYCARDIA, UNSPECIFIED: ICD-10-CM

## 2024-05-06 LAB
ALBUMIN SERPL BCP-MCNC: 4.1 G/DL (ref 3.4–5)
ALP SERPL-CCNC: 45 U/L (ref 33–136)
ALT SERPL W P-5'-P-CCNC: 22 U/L (ref 10–52)
ANION GAP SERPL CALC-SCNC: 10 MMOL/L (ref 10–20)
AST SERPL W P-5'-P-CCNC: 21 U/L (ref 9–39)
BILIRUB SERPL-MCNC: 0.6 MG/DL (ref 0–1.2)
BUN SERPL-MCNC: 27 MG/DL (ref 6–23)
CALCIUM SERPL-MCNC: 8.7 MG/DL (ref 8.6–10.3)
CHLORIDE SERPL-SCNC: 106 MMOL/L (ref 98–107)
CHOLEST SERPL-MCNC: 116 MG/DL (ref 0–199)
CHOLESTEROL/HDL RATIO: 3.9
CO2 SERPL-SCNC: 28 MMOL/L (ref 21–32)
CREAT SERPL-MCNC: 1.21 MG/DL (ref 0.5–1.3)
EGFRCR SERPLBLD CKD-EPI 2021: 60 ML/MIN/1.73M*2
GLUCOSE SERPL-MCNC: 128 MG/DL (ref 74–99)
HDLC SERPL-MCNC: 30 MG/DL
LDLC SERPL CALC-MCNC: 63 MG/DL
NON HDL CHOLESTEROL: 86 MG/DL (ref 0–149)
POTASSIUM SERPL-SCNC: 4.2 MMOL/L (ref 3.5–5.3)
PROT SERPL-MCNC: 6.4 G/DL (ref 6.4–8.2)
SODIUM SERPL-SCNC: 140 MMOL/L (ref 136–145)
TRIGL SERPL-MCNC: 116 MG/DL (ref 0–149)
VLDL: 23 MG/DL (ref 0–40)

## 2024-05-06 PROCEDURE — 80053 COMPREHEN METABOLIC PANEL: CPT

## 2024-05-06 PROCEDURE — 80061 LIPID PANEL: CPT

## 2024-05-06 PROCEDURE — 36415 COLL VENOUS BLD VENIPUNCTURE: CPT

## 2024-05-07 ENCOUNTER — HOSPITAL ENCOUNTER (OUTPATIENT)
Dept: RADIOLOGY | Facility: CLINIC | Age: 84
Discharge: HOME | End: 2024-05-07
Payer: MEDICARE

## 2024-05-07 ENCOUNTER — HOSPITAL ENCOUNTER (OUTPATIENT)
Dept: CARDIOLOGY | Facility: CLINIC | Age: 84
Discharge: HOME | End: 2024-05-07
Payer: MEDICARE

## 2024-05-07 DIAGNOSIS — R00.1 SINUS BRADYCARDIA: Primary | ICD-10-CM

## 2024-05-07 DIAGNOSIS — I25.10 CAD (CORONARY ARTERY DISEASE): ICD-10-CM

## 2024-05-07 PROCEDURE — 3430000001 HC RX 343 DIAGNOSTIC RADIOPHARMACEUTICALS: Performed by: INTERNAL MEDICINE

## 2024-05-07 PROCEDURE — 78452 HT MUSCLE IMAGE SPECT MULT: CPT

## 2024-05-07 PROCEDURE — A9502 TC99M TETROFOSMIN: HCPCS | Performed by: INTERNAL MEDICINE

## 2024-05-07 PROCEDURE — 93017 CV STRESS TEST TRACING ONLY: CPT

## 2024-05-07 PROCEDURE — 2500000004 HC RX 250 GENERAL PHARMACY W/ HCPCS (ALT 636 FOR OP/ED): Performed by: INTERNAL MEDICINE

## 2024-05-07 RX ORDER — REGADENOSON 0.08 MG/ML
0.4 INJECTION, SOLUTION INTRAVENOUS ONCE
Status: COMPLETED | OUTPATIENT
Start: 2024-05-07 | End: 2024-05-07

## 2024-05-07 RX ADMIN — TETROFOSMIN 35.1 MILLICURIE: 0.23 INJECTION, POWDER, LYOPHILIZED, FOR SOLUTION INTRAVENOUS at 12:44

## 2024-05-07 RX ADMIN — REGADENOSON 0.4 MG: 0.08 INJECTION, SOLUTION INTRAVENOUS at 12:44

## 2024-05-07 RX ADMIN — TETROFOSMIN 10.7 MILLICURIE: 0.23 INJECTION, POWDER, LYOPHILIZED, FOR SOLUTION INTRAVENOUS at 11:38

## 2024-05-10 ENCOUNTER — APPOINTMENT (OUTPATIENT)
Dept: CARDIOLOGY | Facility: CLINIC | Age: 84
End: 2024-05-10
Payer: MEDICARE

## 2024-05-15 ENCOUNTER — ANCILLARY PROCEDURE (OUTPATIENT)
Dept: CARDIOLOGY | Facility: CLINIC | Age: 84
End: 2024-05-15
Payer: MEDICARE

## 2024-05-15 DIAGNOSIS — R00.1 BRADYCARDIA, UNSPECIFIED: ICD-10-CM

## 2024-05-15 PROCEDURE — 93225 XTRNL ECG REC<48 HRS REC: CPT | Performed by: INTERNAL MEDICINE

## 2024-05-15 PROCEDURE — 93227 XTRNL ECG REC<48 HR R&I: CPT | Performed by: INTERNAL MEDICINE

## 2024-05-22 ENCOUNTER — OFFICE VISIT (OUTPATIENT)
Dept: CARDIOLOGY | Facility: CLINIC | Age: 84
End: 2024-05-22
Payer: MEDICARE

## 2024-05-22 VITALS
SYSTOLIC BLOOD PRESSURE: 132 MMHG | HEART RATE: 60 BPM | HEIGHT: 66 IN | DIASTOLIC BLOOD PRESSURE: 64 MMHG | BODY MASS INDEX: 34.89 KG/M2 | WEIGHT: 217.1 LBS

## 2024-05-22 DIAGNOSIS — E78.2 MIXED HYPERLIPIDEMIA: Primary | ICD-10-CM

## 2024-05-22 DIAGNOSIS — I25.10 CORONARY ARTERY DISEASE INVOLVING NATIVE HEART, UNSPECIFIED VESSEL OR LESION TYPE, UNSPECIFIED WHETHER ANGINA PRESENT: ICD-10-CM

## 2024-05-22 DIAGNOSIS — Z78.9 NEVER SMOKED CIGARETTES: ICD-10-CM

## 2024-05-22 DIAGNOSIS — R00.1 SINUS BRADYCARDIA: ICD-10-CM

## 2024-05-22 DIAGNOSIS — I35.0 AORTIC VALVE STENOSIS, ETIOLOGY OF CARDIAC VALVE DISEASE UNSPECIFIED: ICD-10-CM

## 2024-05-22 DIAGNOSIS — I10 ESSENTIAL HYPERTENSION: ICD-10-CM

## 2024-05-22 PROCEDURE — 3075F SYST BP GE 130 - 139MM HG: CPT | Performed by: INTERNAL MEDICINE

## 2024-05-22 PROCEDURE — 1159F MED LIST DOCD IN RCRD: CPT | Performed by: INTERNAL MEDICINE

## 2024-05-22 PROCEDURE — 99214 OFFICE O/P EST MOD 30 MIN: CPT | Performed by: INTERNAL MEDICINE

## 2024-05-22 PROCEDURE — 1036F TOBACCO NON-USER: CPT | Performed by: INTERNAL MEDICINE

## 2024-05-22 PROCEDURE — 3078F DIAST BP <80 MM HG: CPT | Performed by: INTERNAL MEDICINE

## 2024-05-22 RX ORDER — LEVOTHYROXINE SODIUM 75 UG/1
75 TABLET ORAL DAILY
COMMUNITY
Start: 2024-04-16

## 2024-05-22 RX ORDER — LANOLIN ALCOHOL/MO/W.PET/CERES
1000 CREAM (GRAM) TOPICAL
COMMUNITY

## 2024-05-22 RX ORDER — MECLIZINE HYDROCHLORIDE 25 MG/1
25 TABLET ORAL 3 TIMES DAILY PRN
COMMUNITY
Start: 2022-10-25

## 2024-05-22 NOTE — PATIENT INSTRUCTIONS
Continue same medications and treatments.   Patient educated on proper medication use.   Patient educated on risk factor modification.   Please bring any lab results from other providers / physicians to your next appointment.     Please bring all medicines, vitamins, and herbal supplements with you when you come to the office.     Prescriptions will not be filled unless you are compliant with your follow up appointments or have a follow up appointment scheduled as per instruction of your physician. Refills should be requested at the time of your visit.    FOLLOW UP IN 1 YEAR    OBTAIN LAB WORK PRIOR TO THIS VISIT, THIS WILL BE FASTING    I, Claudine Crow LPN, am scribing for and in the presence of Dr. Ron Argueta, DO, FACC

## 2024-05-22 NOTE — PROGRESS NOTES
CARDIOLOGY OFFICE VISIT      CHIEF COMPLAINT  Chief Complaint   Patient presents with    Follow-up     1 year follow up and to review recent testing results        HISTORY OF PRESENT ILLNESS  The patient is a 82-year-old  male with past medical history significant for coronary artery  disease, status post drug-eluting stent to left circumflex on 2012, hypertension, dyslipidemia, chronic dizziness, vertigo who presents for followup cardiovascular care.      Patient has chronic dizziness attributed to vertigo. Denies chest pain, dyspnea, palpitations, nausea, vomiting, near-syncope, magnolia syncope, edema.  Patient has left knee pain and has seen orthopedics.        PAST MEDICAL HISTORY: As above, plus:   1. History of elevated liver enzymes.   2. Renal insufficiency.      PAST SURGICAL HISTORY:   Cholecystectomy     SOCIAL HISTORY: Denies tobacco use. Drinks five to six beers per day.      FAMILY HISTORY: Brother alive at 72 with coronary artery bypass graft surgery  66. Brother alive at 65 with a history of angioplasty in his 60s. Sister alive  at 66 with a history of angioplasty at 64. Mom  at 82 due to aneurysm.   Dad  at 62 due to cerebrovascular accident and complications of carotid  endarterectomy.      Review systems are negative, noncontributory, orders previously mentioned Ã--12 systems.     I personally reviewed EKG May 10, 2023: Sinus bradycardia with first-degree AV block, ventricular rate 48 bpm.        ASSESSMENT:     Coronary artery disease, status post drug-eluting stent left circumflex on 2012.  Aortic valve stenosis  Sinus bradycardia- asymptomatic  Hypertension.  Dyslipidemia.  Family history of premature atherosclerotic disease.  Obesity.  Gastroesophageal reflux disease.  History of elevated liver enzymes.  History of renal insufficiency.  Hyperglycemia  Vertigo-Dr. Mcgregor neurology [facility]     RECOMMENDATIONS:  The patient appears to be stable from a  cardiac standpoint. He has no symptoms of angina. No ischemia on stress test. Normal left ventricular systolic function. No evidence of heart failure. Mild-moderate valvular heart disease asymptomatic.  No symptomatic bradycardia. Blood pressure is under good control. Lipid profile within acceptable limits. Advise diet, weight loss, exercise program 30 minutes/day.  Follow-up in 1 year.  Check CMP, lipid profile 1 year.  Consider repeat echocardiogram in 2 years.     Please excuse any errors in grammar or translation related to this dictation. Voice recognition software was utilized to prepare this document.     Recent Cardiovascular Testing:  The following results have been reviewed and updated.   Echo: 5/3/23  1. EF 60%  2. Mild mitral valve regurgitation  3. Mild tricuspid regurgitation  4. Mild ot moderate aortic valve stenosis  5. Mild aortic valve regurgitation   Carotid Duplex: 11/14/12  1. No significant plaque     Labs 5/6/24  , , HDL 30, LDL 63    24 Hour Holter 5/15/24  Sinus rhythm average 60 bpm, low heart rate 48-84 bpm  Rare PVC's, one 4 beat run of accelerated idioventricular rhythm 90 bpm  Frequent pauses all less than 2.2 seconds     MPL: 5/7/24  1. Normal.  EF 65%.  3. 4.6 METs.    VITALS  Vitals:    05/22/24 1426   BP: 132/64   Pulse: 60     Wt Readings from Last 4 Encounters:   05/22/24 98.5 kg (217 lb 1.6 oz)   05/10/23 97.6 kg (215 lb 2 oz)   05/11/22 95.9 kg (211 lb 7 oz)   04/05/22 99.8 kg (220 lb)       PHYSICAL EXAM:  GENERAL:  Well developed, well nourished, in no acute distress.  CHEST:  Symmetric and nontender.  NEURO/PSYCH:  Alert and oriented times three with approppriate behavior and responses.  NECK:  Supple, no JVD, no bruit.  LUNGS:  Clear to auscultation bilaterally, normal respiratory effort.  HEART:  Rate and rhythm REGULAR with no evident murmur, no gallop appreciated.    There are no rubs, clicks or heaves.  EXTREMITIES:  Warm with good color, no clubbing or  cyanosis.  There is trace bilateral lower extremity edema noted.  PERIPHERAL VASCULAR:  Pulses present and equally palpable; 2+ throughout.    ASSESSMENT AND PLAN  Diagnoses and all orders for this visit:  Mixed hyperlipidemia  -     Lipid panel; Future  Coronary artery disease involving native heart, unspecified vessel or lesion type, unspecified whether angina present  -     Comprehensive metabolic panel; Future  Aortic valve stenosis, etiology of cardiac valve disease unspecified  Sinus bradycardia  Essential hypertension  BMI 35.0-35.9,adult  Never smoked cigarettes      Past Medical History  Past Medical History:   Diagnosis Date    Personal history of other diseases of the circulatory system 10/23/2018    History of hypertension    Personal history of other diseases of the circulatory system 04/05/2022    History of cardiac disorder    Personal history of other diseases of the circulatory system 04/05/2022    History of hypertension    Personal history of other diseases of the musculoskeletal system and connective tissue 04/05/2022    History of arthritis    Personal history of other endocrine, nutritional and metabolic disease 10/23/2018    History of hyperlipidemia    Personal history of other specified conditions 04/05/2022    History of balance disorder    Unspecified visual loss 04/05/2022    Vision problems       Social History  Social History     Tobacco Use    Smoking status: Never    Smokeless tobacco: Never   Substance Use Topics    Alcohol use: Yes     Comment: 3-4X A WEEK    Drug use: Not Currently       Family History     Family History   Problem Relation Name Age of Onset    Hypertension Mother      Hyperlipidemia Mother      Aortic aneurysm Mother      Hypertension Father      Hyperlipidemia Father      Coronary artery disease Father      Colon cancer Brother      Aortic aneurysm Maternal Grandmother      Hypertension Other sibling     Hyperlipidemia Other sibling         Allergies:  No Known  "Allergies     Outpatient Medications:  Current Outpatient Medications   Medication Instructions    amLODIPine (Norvasc) 2.5 mg tablet 1 tablet, oral, 2 times daily    atorvastatin (Lipitor) 40 mg tablet 1 tablet, oral, Daily    clopidogrel (PLAVIX) 75 mg, oral, Daily    cyanocobalamin (VITAMIN B-12) 1,000 mcg, oral, Daily RT    cyclobenzaprine (FLEXERIL) 5 mg, oral, 2 times daily    isosorbide mononitrate ER (Imdur) 30 mg 24 hr tablet 1 tablet, oral, Daily    levothyroxine (SYNTHROID, LEVOXYL) 75 mcg, oral, Daily    lisinopril 40 mg tablet 1 tablet, oral, Daily    meclizine (ANTIVERT) 25 mg, oral, 3 times daily PRN    metoprolol tartrate (Lopressor) 25 mg tablet 2 tablets, oral, 2 times daily    nitroglycerin (NITROSTAT) 0.4 mg, sublingual, Every 5 min PRN, PLACE 1 TABLET UNDER THE TONGUE EVERY 5 MINUTES UP TO 3 DOSES AS NEEDED FOR CHEST PAIN.<BR>    omeprazole (PRILOSEC) 20 mg, oral, 2 times daily before meals    prednisoLONE acetate (Pred-Forte) 1 % ophthalmic suspension 1 drop, Both Eyes, Daily    sertraline (Zoloft) 25 mg tablet 1 tablet, oral, Nightly    terazosin (HYTRIN) 2 mg, oral, Nightly    zinc acetate 50 mg (zinc) capsule 1 capsule, oral, 1 cap 1-2x a week        Recent Lab Results:    CMP:    Lab Results   Component Value Date     05/06/2024    K 4.2 05/06/2024     05/06/2024    CO2 28 05/06/2024    BUN 27 (H) 05/06/2024    CREATININE 1.21 05/06/2024    GLUCOSE 128 (H) 05/06/2024    CALCIUM 8.7 05/06/2024       Magnesium:    No results found for: \"MG\"    Lipid Profile:    Lab Results   Component Value Date    TRIG 116 05/06/2024    HDL 30.0 05/06/2024    LDLCALC 63 05/06/2024       TSH:    No results found for: \"TSH\"    BNP:   No results found for: \"BNP\"     PT/INR:    No results found for: \"PROTIME\", \"INR\"    HgBA1c:    Lab Results   Component Value Date    HGBA1C 5.9 (H) 02/21/2024       BMP:  Lab Results   Component Value Date     05/06/2024     05/03/2023     05/04/2022 " "    05/03/2021    K 4.2 05/06/2024    K 4.4 05/03/2023    K 4.6 05/04/2022    K 4.2 05/03/2021     05/06/2024     05/03/2023     05/04/2022     05/03/2021    CO2 28 05/06/2024    CO2 28 05/03/2023    CO2 29 05/04/2022    CO2 30 05/03/2021    BUN 27 (H) 05/06/2024    BUN 17 05/03/2023    BUN 16 05/04/2022    BUN 16 05/03/2021    CREATININE 1.21 05/06/2024    CREATININE 1.14 05/03/2023    CREATININE 1.12 05/04/2022    CREATININE 1.17 05/03/2021       CBC:  No results found for: \"WBC\", \"RBC\", \"HGB\", \"HCT\", \"MCV\", \"MCH\", \"MCHC\", \"RDW\", \"PLT\", \"MPV\"    Cardiac Enzymes:    No results found for: \"TROPHS\"    Hepatic Function Panel:    Lab Results   Component Value Date    ALKPHOS 45 05/06/2024    ALT 22 05/06/2024    AST 21 05/06/2024    PROT 6.4 05/06/2024    BILITOT 0.6 05/06/2024           Ron Argueta, DO        "

## 2025-05-21 ENCOUNTER — APPOINTMENT (OUTPATIENT)
Dept: CARDIOLOGY | Facility: CLINIC | Age: 85
End: 2025-05-21
Payer: MEDICARE

## 2025-05-22 LAB
ALBUMIN SERPL-MCNC: 4.4 G/DL (ref 3.6–5.1)
ALP SERPL-CCNC: 53 U/L (ref 35–144)
ALT SERPL-CCNC: 18 U/L (ref 9–46)
ANION GAP SERPL CALCULATED.4IONS-SCNC: 7 MMOL/L (CALC) (ref 7–17)
AST SERPL-CCNC: 18 U/L (ref 10–35)
BILIRUB SERPL-MCNC: 0.6 MG/DL (ref 0.2–1.2)
BUN SERPL-MCNC: 19 MG/DL (ref 7–25)
CALCIUM SERPL-MCNC: 8.8 MG/DL (ref 8.6–10.3)
CHLORIDE SERPL-SCNC: 106 MMOL/L (ref 98–110)
CHOLEST SERPL-MCNC: 128 MG/DL
CHOLEST/HDLC SERPL: 3.9 (CALC)
CO2 SERPL-SCNC: 26 MMOL/L (ref 20–32)
CREAT SERPL-MCNC: 1.09 MG/DL (ref 0.7–1.22)
EGFRCR SERPLBLD CKD-EPI 2021: 68 ML/MIN/1.73M2
GLUCOSE SERPL-MCNC: 127 MG/DL (ref 65–99)
HDLC SERPL-MCNC: 33 MG/DL
LDLC SERPL CALC-MCNC: 74 MG/DL (CALC)
NONHDLC SERPL-MCNC: 95 MG/DL (CALC)
POTASSIUM SERPL-SCNC: 4.4 MMOL/L (ref 3.5–5.3)
PROT SERPL-MCNC: 6.8 G/DL (ref 6.1–8.1)
SODIUM SERPL-SCNC: 139 MMOL/L (ref 135–146)
TRIGL SERPL-MCNC: 119 MG/DL

## 2025-06-04 ENCOUNTER — APPOINTMENT (OUTPATIENT)
Dept: CARDIOLOGY | Facility: CLINIC | Age: 85
End: 2025-06-04
Payer: MEDICARE

## 2025-06-04 VITALS
SYSTOLIC BLOOD PRESSURE: 134 MMHG | DIASTOLIC BLOOD PRESSURE: 62 MMHG | WEIGHT: 215.9 LBS | BODY MASS INDEX: 35.97 KG/M2 | HEIGHT: 65 IN | HEART RATE: 54 BPM

## 2025-06-04 DIAGNOSIS — K21.9 GASTROESOPHAGEAL REFLUX DISEASE, UNSPECIFIED WHETHER ESOPHAGITIS PRESENT: ICD-10-CM

## 2025-06-04 DIAGNOSIS — E78.2 MIXED HYPERLIPIDEMIA: ICD-10-CM

## 2025-06-04 DIAGNOSIS — I35.0 AORTIC VALVE STENOSIS, ETIOLOGY OF CARDIAC VALVE DISEASE UNSPECIFIED: ICD-10-CM

## 2025-06-04 DIAGNOSIS — R00.1 SINUS BRADYCARDIA: ICD-10-CM

## 2025-06-04 DIAGNOSIS — I25.10 CORONARY ARTERY DISEASE INVOLVING NATIVE HEART, UNSPECIFIED VESSEL OR LESION TYPE, UNSPECIFIED WHETHER ANGINA PRESENT: ICD-10-CM

## 2025-06-04 DIAGNOSIS — R73.9 HYPERGLYCEMIA: ICD-10-CM

## 2025-06-04 DIAGNOSIS — I10 ESSENTIAL HYPERTENSION: ICD-10-CM

## 2025-06-04 DIAGNOSIS — Z78.9 NEVER SMOKED CIGARETTES: ICD-10-CM

## 2025-06-04 PROCEDURE — 3075F SYST BP GE 130 - 139MM HG: CPT | Performed by: INTERNAL MEDICINE

## 2025-06-04 PROCEDURE — 3078F DIAST BP <80 MM HG: CPT | Performed by: INTERNAL MEDICINE

## 2025-06-04 PROCEDURE — 1159F MED LIST DOCD IN RCRD: CPT | Performed by: INTERNAL MEDICINE

## 2025-06-04 PROCEDURE — 99214 OFFICE O/P EST MOD 30 MIN: CPT | Performed by: INTERNAL MEDICINE

## 2025-06-04 PROCEDURE — 1036F TOBACCO NON-USER: CPT | Performed by: INTERNAL MEDICINE

## 2025-06-04 RX ORDER — METOPROLOL TARTRATE 25 MG/1
25 TABLET, FILM COATED ORAL 2 TIMES DAILY
Qty: 180 TABLET | Refills: 3 | OUTPATIENT
Start: 2025-06-04

## 2025-06-04 RX ORDER — AMLODIPINE BESYLATE 5 MG/1
5 TABLET ORAL 2 TIMES DAILY
Qty: 180 TABLET | Refills: 3 | Status: SHIPPED | OUTPATIENT
Start: 2025-06-04 | End: 2026-06-04

## 2025-06-04 RX ORDER — NITROGLYCERIN 0.4 MG/1
TABLET SUBLINGUAL
Qty: 25 TABLET | Refills: 5 | Status: SHIPPED | OUTPATIENT
Start: 2025-06-04

## 2025-06-04 NOTE — PROGRESS NOTES
CARDIOLOGY OFFICE VISIT      CHIEF COMPLAINT  Dizziness, near-syncope       HISTORY OF PRESENT ILLNESS  The patient is a 84-year-old  male with past medical history significant for coronary artery disease, status post drug-eluting stent to left circumflex on 2012, hypertension, dyslipidemia, chronic dizziness, vertigo who presents for followup cardiovascular care.     Patient has chronic dizziness attributed to vertigo.  He does physical therapy exercises to improve symptoms.  He had 1 episode of near syncope when bending over.  Denies magnolia syncope.  Denies chest pain, dyspnea, palpitations, nausea, vomiting, edema.  No formal exercise program.  He tolerated left total knee replacement last October without cardiac complication.     PAST MEDICAL HISTORY: As above, plus:   1. History of elevated liver enzymes.   2. Renal insufficiency.      PAST SURGICAL HISTORY:   Cholecystectomy  Left total knee replacement 2024     SOCIAL HISTORY: Denies tobacco use. Drinks five to six beers per day.      FAMILY HISTORY: Brother alive at 72 with coronary artery bypass graft surgery  66. Brother alive at 65 with a history of angioplasty in his 60s. Sister alive  at 66 with a history of angioplasty at 64. Mom  at 82 due to aneurysm.   Dad  at 62 due to cerebrovascular accident and complications of carotid  endarterectomy.      Review systems are negative, noncontributory, orders previously mentioned x12 systems.     I personally reviewed EKG May 10, 2023: Sinus bradycardia with first-degree AV block, ventricular rate 48 bpm.        ASSESSMENT:   Dizziness/near syncope  Coronary artery disease, status post drug-eluting stent left circumflex on 2012.  Aortic valve stenosis  Sinus bradycardia- asymptomatic  Hypertension.  Dyslipidemia.  Family history of premature atherosclerotic disease.  Obesity.  Gastroesophageal reflux disease.  History of elevated liver enzymes.  History  of renal insufficiency.  Hyperglycemia  Vertigo-Dr. Mcgregor neurology [facility]     RECOMMENDATIONS:  The patient appears to be stable from a cardiac standpoint. He has no symptoms of angina. No ischemia on stress test. Normal left ventricular systolic function. No evidence of heart failure. Mild-moderate valvular heart disease asymptomatic.  Patient has chronic dizziness and 1 episode of near syncope.  Will decrease his beta-blocker Lopressor to 25 mg twice daily.  Increase amlodipine 5 mg twice daily.  Blood pressure is under good control. Lipid profile within acceptable limits. Advise diet, weight loss, exercise program 30 minutes/day.  Follow-up in 1 year.  Check CMP, lipid profile 1 year.  In 1 year obtain echocardiogram, and 2-week event monitor.     Please excuse any errors in grammar or translation related to this dictation. Voice recognition software was utilized to prepare this document.     Recent Cardiovascular Testing:  The following results have been reviewed and updated.     Echo: 5/3/23  1. EF 60%  2. Mild mitral valve regurgitation  3. Mild tricuspid regurgitation  4. Mild ot moderate aortic valve stenosis  5. Mild aortic valve regurgitation   Carotid Duplex: 11/14/12  1. No significant plaque     Labs 5/22/25  , , HDL 33, LDL 74     24 Hour Holter 5/15/24  Sinus rhythm average 60 bpm, low heart rate 48-84 bpm  Rare PVC's, one 4 beat run of accelerated idioventricular rhythm 90 bpm  Frequent pauses all less than 2.2 seconds     MPL: 5/7/24  1. Normal.  EF 65%.  3. 4.6 METs.    VITALS  Vitals:    06/04/25 1103   BP: 134/62   Pulse: 54     Wt Readings from Last 4 Encounters:   05/22/24 98.5 kg (217 lb 1.6 oz)   05/10/23 97.6 kg (215 lb 2 oz)   05/11/22 95.9 kg (211 lb 7 oz)   04/05/22 99.8 kg (220 lb)       PHYSICAL EXAM:  GENERAL:  Well developed, well nourished, in no acute distress.  CHEST:  Symmetric and nontender.  NEURO/PSYCH:  Alert and oriented times three with approppriate behavior and  responses.  NECK:  Supple, no JVD, no bruit.  LUNGS:  Clear to auscultation bilaterally, normal respiratory effort.  HEART:  Rate and rhythm regular with 2/6 ARELY at LSB, no gallop appreciated.    There are no rubs, clicks or heaves.  EXTREMITIES:  Warm with good color, no clubbing or cyanosis.  There is no edema noted.  PERIPHERAL VASCULAR:  Pulses present and equally palpable; 2+ throughout.    ASSESSMENT AND PLAN  Diagnoses and all orders for this visit:  Coronary artery disease involving native heart, unspecified vessel or lesion type, unspecified whether angina present  -     nitroglycerin (Nitrostat) 0.4 mg SL tablet; PLACE 1 TABLET UNDER THE TONGUE EVERY 5 MINUTES FOR UP TO 3 DOSES AS NEEDED FOR CHEST PAIN. CALL 911 IF PAIN PERSISTS.  -     Transthoracic echo (TTE) complete; Future  -     perflutren lipid microspheres (Definity) injection 0.5-10 mL of dilution  -     sulfur hexafluoride microsphr (Lumason) injection 24.28 mg  -     perflutren protein A microsphere (Optison) injection 0.5 mL  -     Comprehensive metabolic panel; Future  -     metoprolol tartrate (Lopressor) 25 mg tablet; Take 1 tablet (25 mg) by mouth 2 times a day.  -     Follow Up In Cardiology; Future  Aortic valve stenosis, etiology of cardiac valve disease unspecified  -     Transthoracic echo (TTE) complete; Future  -     perflutren lipid microspheres (Definity) injection 0.5-10 mL of dilution  -     sulfur hexafluoride microsphr (Lumason) injection 24.28 mg  -     perflutren protein A microsphere (Optison) injection 0.5 mL  -     Follow Up In Cardiology; Future  Sinus bradycardia  -     Holter Or Event Cardiac Monitor; Future  -     Follow Up In Cardiology; Future  Essential hypertension  -     Comprehensive metabolic panel; Future  -     amLODIPine (Norvasc) 5 mg tablet; Take 1 tablet (5 mg) by mouth 2 times a day.  -     Follow Up In Cardiology; Future  Mixed hyperlipidemia  -     Lipid panel; Future  -     Follow Up In Cardiology;  "Future  Gastroesophageal reflux disease, unspecified whether esophagitis present  -     Follow Up In Cardiology; Future  Hyperglycemia  -     Follow Up In Cardiology; Future  Never smoked cigarettes  -     Follow Up In Cardiology; Future  BMI 35.0-35.9,adult  -     Follow Up In Cardiology; Future      Past Medical History  Medical History[1]    Social History  Social History[2]    Family History   Family History[3]     Allergies:  RX Allergies[4]     Outpatient Medications:  Current Outpatient Medications   Medication Instructions    amLODIPine (Norvasc) 2.5 mg tablet 1 tablet, oral, 2 times daily    atorvastatin (Lipitor) 40 mg tablet 1 tablet, oral, Daily    clopidogrel (PLAVIX) 75 mg, oral, Daily    cyanocobalamin (VITAMIN B-12) 1,000 mcg, oral, Daily RT    cyclobenzaprine (FLEXERIL) 5 mg, oral, 2 times daily    isosorbide mononitrate ER (Imdur) 30 mg 24 hr tablet 1 tablet, oral, Daily    levothyroxine (SYNTHROID, LEVOXYL) 75 mcg, oral, Daily    lisinopril 40 mg tablet 1 tablet, oral, Daily    meclizine (ANTIVERT) 25 mg, oral, 3 times daily PRN    metoprolol tartrate (Lopressor) 25 mg tablet 2 tablets, oral, 2 times daily    nitroglycerin (NITROSTAT) 0.4 mg, sublingual, Every 5 min PRN, PLACE 1 TABLET UNDER THE TONGUE EVERY 5 MINUTES UP TO 3 DOSES AS NEEDED FOR CHEST PAIN.      omeprazole (PRILOSEC) 20 mg, oral, 2 times daily before meals    prednisoLONE acetate (Pred-Forte) 1 % ophthalmic suspension 1 drop, Both Eyes, Daily    sertraline (Zoloft) 25 mg tablet 1 tablet, oral, Nightly    terazosin (HYTRIN) 2 mg, oral, Nightly    zinc acetate 50 mg (zinc) capsule 1 capsule, oral, 1 cap 1-2x a week        Recent Lab Results:    CMP:    Lab Results   Component Value Date     05/22/2025    K 4.4 05/22/2025     05/22/2025    CO2 26 05/22/2025    BUN 19 05/22/2025    CREATININE 1.09 05/22/2025    GLUCOSE 127 (H) 05/22/2025    CALCIUM 8.8 05/22/2025       Magnesium:    No results found for: \"MG\"    Lipid " "Profile:    Lab Results   Component Value Date    TRIG 119 05/22/2025    HDL 33 (L) 05/22/2025    LDLCALC 74 05/22/2025       TSH:    No results found for: \"TSH\"    BNP:   No results found for: \"BNP\"     PT/INR:    No results found for: \"PROTIME\", \"INR\"    HgBA1c:    Lab Results   Component Value Date    HGBA1C 5.9 (H) 02/21/2024       BMP:  Lab Results   Component Value Date     05/22/2025     05/06/2024     05/03/2023     05/04/2022     05/03/2021    K 4.4 05/22/2025    K 4.2 05/06/2024    K 4.4 05/03/2023    K 4.6 05/04/2022    K 4.2 05/03/2021     05/22/2025     05/06/2024     05/03/2023     05/04/2022     05/03/2021    CO2 26 05/22/2025    CO2 28 05/06/2024    CO2 28 05/03/2023    CO2 29 05/04/2022    CO2 30 05/03/2021    BUN 19 05/22/2025    BUN 27 (H) 05/06/2024    BUN 17 05/03/2023    BUN 16 05/04/2022    BUN 16 05/03/2021    CREATININE 1.09 05/22/2025    CREATININE 1.21 05/06/2024    CREATININE 1.14 05/03/2023    CREATININE 1.12 05/04/2022    CREATININE 1.17 05/03/2021       CBC:  No results found for: \"WBC\", \"RBC\", \"HGB\", \"HCT\", \"MCV\", \"MCH\", \"MCHC\", \"RDW\", \"PLT\", \"MPV\"    Cardiac Enzymes:    No results found for: \"TROPHS\"    Hepatic Function Panel:    Lab Results   Component Value Date    ALKPHOS 53 05/22/2025    ALT 18 05/22/2025    AST 18 05/22/2025    PROT 6.8 05/22/2025    BILITOT 0.6 05/22/2025         IClaudine LPN am scribing for, and in the presence of Dr. Ron Argueta DO, [facility].    I, Dr. Ron Argueta DO, [facility], personally performed the services described in the documentation as scribed by Claudine Crow LPN in my presence, and confirm it is both accurate and complete.    Dr. Ron Argueta DO  Thank you for allowing me to participate in the care of this patient. Please do not hesitate to contact me with any further questions or concerns.               [1]   Past Medical History:  Diagnosis Date    Personal history of " other diseases of the circulatory system 10/23/2018    History of hypertension    Personal history of other diseases of the circulatory system 04/05/2022    History of cardiac disorder    Personal history of other diseases of the circulatory system 04/05/2022    History of hypertension    Personal history of other diseases of the musculoskeletal system and connective tissue 04/05/2022    History of arthritis    Personal history of other endocrine, nutritional and metabolic disease 10/23/2018    History of hyperlipidemia    Personal history of other specified conditions 04/05/2022    History of balance disorder    Unspecified visual loss 04/05/2022    Vision problems   [2]   Social History  Tobacco Use    Smoking status: Never    Smokeless tobacco: Never   Substance Use Topics    Alcohol use: Yes     Comment: 3-4X A WEEK    Drug use: Not Currently   [3]   Family History  Problem Relation Name Age of Onset    Hypertension Mother      Hyperlipidemia Mother      Aortic aneurysm Mother      Hypertension Father      Hyperlipidemia Father      Coronary artery disease Father      Colon cancer Brother      Aortic aneurysm Maternal Grandmother      Hypertension Other sibling     Hyperlipidemia Other sibling    [4] No Known Allergies

## 2025-06-04 NOTE — PATIENT INSTRUCTIONS
Continue same medications and treatments.   Patient educated on proper medication use.   Patient educated on risk factor modification.   Please bring any lab results from other providers / physicians to your next appointment.     Please bring all medicines, vitamins, and herbal supplements with you when you come to the office.     Prescriptions will not be filled unless you are compliant with your follow up appointments or have a follow up appointment scheduled as per instruction of your physician. Refills should be requested at the time of your visit.    DECREASE METOPROLOL TARTRATE TO 25 MG TWICE DAILY  INCREASE AMLODIPINE TO 5 MG TWICE DAILY    OBTAIN ECHO IN 1 YEAR  OBTAIN 14 DAY BARDY MONITOR IN 1 YEAR    OBTAIN FASTING LAB WORK IN 1 YEAR PRIOR TO YOUR OFFICE VISIT    FOLLOW UP IN 1 YEAR    I, Claudine Crow LPN, am scribing for and in the presence of Dr. Ron Argueta, DO, FACC

## 2026-06-03 ENCOUNTER — APPOINTMENT (OUTPATIENT)
Dept: CARDIOLOGY | Facility: CLINIC | Age: 86
End: 2026-06-03
Payer: MEDICARE